# Patient Record
Sex: MALE | Race: WHITE | NOT HISPANIC OR LATINO | Employment: FULL TIME | URBAN - METROPOLITAN AREA
[De-identification: names, ages, dates, MRNs, and addresses within clinical notes are randomized per-mention and may not be internally consistent; named-entity substitution may affect disease eponyms.]

---

## 2023-04-23 DIAGNOSIS — E78.5 HYPERLIPIDEMIA, UNSPECIFIED: ICD-10-CM

## 2023-04-23 DIAGNOSIS — I10 ESSENTIAL (PRIMARY) HYPERTENSION: ICD-10-CM

## 2023-04-24 RX ORDER — ATORVASTATIN CALCIUM 40 MG/1
TABLET, FILM COATED ORAL
Qty: 30 TABLET | Refills: 5 | OUTPATIENT
Start: 2023-04-24

## 2023-04-24 RX ORDER — OLMESARTAN MEDOXOMIL 40 MG/1
TABLET ORAL
Qty: 30 TABLET | Refills: 5 | OUTPATIENT
Start: 2023-04-24

## 2023-04-25 RX ORDER — OLMESARTAN MEDOXOMIL 40 MG/1
40 TABLET ORAL DAILY
Qty: 30 TABLET | Refills: 0 | Status: SHIPPED | OUTPATIENT
Start: 2023-04-25 | End: 2023-05-08 | Stop reason: SDUPTHER

## 2023-04-25 RX ORDER — ATORVASTATIN CALCIUM 40 MG/1
40 TABLET, FILM COATED ORAL DAILY
Qty: 30 TABLET | Refills: 0 | Status: SHIPPED | OUTPATIENT
Start: 2023-04-25 | End: 2023-05-23

## 2023-04-25 RX ORDER — ATORVASTATIN CALCIUM 40 MG/1
40 TABLET, FILM COATED ORAL DAILY
COMMUNITY
Start: 2020-10-25 | End: 2023-04-25 | Stop reason: SDUPTHER

## 2023-04-25 RX ORDER — OLMESARTAN MEDOXOMIL 40 MG/1
40 TABLET ORAL DAILY
COMMUNITY
Start: 2020-10-28 | End: 2023-04-25 | Stop reason: SDUPTHER

## 2023-05-08 ENCOUNTER — OFFICE VISIT (OUTPATIENT)
Dept: PRIMARY CARE | Facility: CLINIC | Age: 58
End: 2023-05-08
Payer: COMMERCIAL

## 2023-05-08 VITALS
WEIGHT: 297.6 LBS | HEART RATE: 83 BPM | SYSTOLIC BLOOD PRESSURE: 130 MMHG | HEIGHT: 67 IN | DIASTOLIC BLOOD PRESSURE: 70 MMHG | BODY MASS INDEX: 46.71 KG/M2

## 2023-05-08 DIAGNOSIS — R53.83 FATIGUE, UNSPECIFIED TYPE: ICD-10-CM

## 2023-05-08 DIAGNOSIS — I71.21 ANEURYSM OF ASCENDING AORTA WITHOUT RUPTURE (CMS-HCC): ICD-10-CM

## 2023-05-08 DIAGNOSIS — Z00.00 HEALTH MAINTENANCE EXAMINATION: Primary | ICD-10-CM

## 2023-05-08 DIAGNOSIS — G47.33 OBSTRUCTIVE SLEEP APNEA: ICD-10-CM

## 2023-05-08 DIAGNOSIS — I10 ESSENTIAL (PRIMARY) HYPERTENSION: ICD-10-CM

## 2023-05-08 DIAGNOSIS — R07.89 CHEST DISCOMFORT: ICD-10-CM

## 2023-05-08 DIAGNOSIS — E78.5 HYPERLIPIDEMIA, UNSPECIFIED HYPERLIPIDEMIA TYPE: ICD-10-CM

## 2023-05-08 DIAGNOSIS — Z23 NEED FOR SHINGLES VACCINE: ICD-10-CM

## 2023-05-08 DIAGNOSIS — I10 HYPERTENSION, UNSPECIFIED TYPE: ICD-10-CM

## 2023-05-08 DIAGNOSIS — R00.0 TACHYCARDIA: ICD-10-CM

## 2023-05-08 LAB
POC APPEARANCE, URINE: CLEAR
POC BILIRUBIN, URINE: NEGATIVE
POC BLOOD, URINE: NEGATIVE
POC COLOR, URINE: YELLOW
POC GLUCOSE, URINE: NEGATIVE MG/DL
POC KETONES, URINE: NEGATIVE MG/DL
POC LEUKOCYTES, URINE: NEGATIVE
POC NITRITE,URINE: NEGATIVE
POC PH, URINE: 6 PH
POC PROTEIN, URINE: NEGATIVE MG/DL
POC SPECIFIC GRAVITY, URINE: 1.01
POC UROBILINOGEN, URINE: 0.2 EU/DL

## 2023-05-08 PROCEDURE — 81002 URINALYSIS NONAUTO W/O SCOPE: CPT | Performed by: STUDENT IN AN ORGANIZED HEALTH CARE EDUCATION/TRAINING PROGRAM

## 2023-05-08 PROCEDURE — 1036F TOBACCO NON-USER: CPT | Performed by: STUDENT IN AN ORGANIZED HEALTH CARE EDUCATION/TRAINING PROGRAM

## 2023-05-08 PROCEDURE — 3078F DIAST BP <80 MM HG: CPT | Performed by: STUDENT IN AN ORGANIZED HEALTH CARE EDUCATION/TRAINING PROGRAM

## 2023-05-08 PROCEDURE — 99396 PREV VISIT EST AGE 40-64: CPT | Performed by: STUDENT IN AN ORGANIZED HEALTH CARE EDUCATION/TRAINING PROGRAM

## 2023-05-08 PROCEDURE — 90750 HZV VACC RECOMBINANT IM: CPT | Performed by: STUDENT IN AN ORGANIZED HEALTH CARE EDUCATION/TRAINING PROGRAM

## 2023-05-08 PROCEDURE — 99213 OFFICE O/P EST LOW 20 MIN: CPT | Performed by: STUDENT IN AN ORGANIZED HEALTH CARE EDUCATION/TRAINING PROGRAM

## 2023-05-08 PROCEDURE — 90471 IMMUNIZATION ADMIN: CPT | Performed by: STUDENT IN AN ORGANIZED HEALTH CARE EDUCATION/TRAINING PROGRAM

## 2023-05-08 PROCEDURE — 93000 ELECTROCARDIOGRAM COMPLETE: CPT | Performed by: STUDENT IN AN ORGANIZED HEALTH CARE EDUCATION/TRAINING PROGRAM

## 2023-05-08 PROCEDURE — 3075F SYST BP GE 130 - 139MM HG: CPT | Performed by: STUDENT IN AN ORGANIZED HEALTH CARE EDUCATION/TRAINING PROGRAM

## 2023-05-08 RX ORDER — OLMESARTAN MEDOXOMIL 40 MG/1
40 TABLET ORAL DAILY
Qty: 90 TABLET | Refills: 1 | Status: SHIPPED | OUTPATIENT
Start: 2023-05-08 | End: 2023-11-27

## 2023-05-08 RX ORDER — METOPROLOL SUCCINATE 50 MG/1
50 TABLET, EXTENDED RELEASE ORAL DAILY
COMMUNITY
End: 2023-05-23

## 2023-05-08 NOTE — PROGRESS NOTES
Subjective   Patient ID: Kendall Watson is a 57 y.o. male who presents for Hypertension and Hyperlipidemia.  Today he is accompanied by alone.     HPI    1. Health Maintenance  Immunizations: Needs second dose of Shingrix.  Colon Cancer Screening: Last performed in 2016, 10 year clearance.  Prostate Cancer Screening: March 2022 was 0.46, prior 0.20.  Diet: Poor  Exercise: Started riding a bike and pickle ball.  Tobacco: Denies    2. HTN/Tachycardia  Currently taking Olmesartan 40 mg daily and Metoprolol ER 50 mg daily.   Denies shortness of breath, headaches, leg pain or edema.  Does not check BP at home.    3. Fatigue  Feels he needs to take a nap daily.  Goes to bed at 9 PM and wakes up at 4 PM.  Occasionally will need to wake up at night to urinate.  He drinks coffee in the morning, and usually no later than 9 AM in the morning.  History of MIGUELINA, currently using mask and uses it nightly.  Recently had this adjusted and feels better but continues to feel fatigued.  He is concerned that this may be related to low testosterone and desires to have this checked.    4. HLD  Currently taking atorvastatin 40 mg daily.  Last lipid panel Sept. 2022 was 163, 49, 72, 209.  Denies myalgias.  Desires to have this rechecked.    5. Chest discomfort  Over the last 6 months he has felt a dull shock in his left chest.  After the dull shock, he will feel lightheaded and dizzy.  This usually lasts for a few seconds then goes away.  Approximately 1 month ago, he felt this happening 3 times a day for 1.5 weeks. He thought this was related to his Tumeric which he stopped.  Since then, his symptoms have not returned.    6. Ascending Aortic Aneurysm   Recently saw his cardiac surgeon in April 2023 and A CT Angio performed.  Currently states his aneurysm is stable, and to follow up in a year for repeat imaging.    Current Outpatient Medications on File Prior to Visit   Medication Sig Dispense Refill    atorvastatin (Lipitor) 40 mg tablet  "Take 1 tablet (40 mg) by mouth once daily. 30 tablet 0    metoprolol succinate XL (Toprol-XL) 50 mg 24 hr tablet Take 1 tablet (50 mg) by mouth once daily.      olmesartan (BENIcar) 40 mg tablet Take 1 tablet (40 mg) by mouth once daily. 30 tablet 0     No current facility-administered medications on file prior to visit.        No Known Allergies    Immunization History   Administered Date(s) Administered    Moderna SARS-CoV-2 Vaccination 02/28/2021, 04/01/2021         Review of Systems  All pertinent positive symptoms are included in the history of present illness.  All other systems have been reviewed and are negative and noncontributory to this patient's current ailments.     Objective   /70 (BP Location: Left arm, Patient Position: Sitting, BP Cuff Size: Adult)   Pulse 83   Ht 1.702 m (5' 7\")   Wt 135 kg (297 lb 9.6 oz)   BMI 46.61 kg/m²   BSA: 2.53 meters squared  No visits with results within 1 Month(s) from this visit.   Latest known visit with results is:   [facility] Encounter on 04/03/2023   Component Date Value Ref Range Status    POC Creatinine 04/03/2023 0.8  0.6 - 1.3 mg/dL Final    POCT GFR - DATA CONVERSION 04/03/2023 >60  >60 mL/min/1.73m2 Final    Comment:  POCT eGFR is intended for Radiology screening purposes only.   If patient is , multiply result by 1.21.         Physical Exam  CONSTITUTIONAL - obese, well developed, looks like stated age, in no acute distress, not ill-appearing, and not tired appearing  SKIN - normal skin color and pigmentation, normal skin turgor without rash, lesions, or nodules visualized  HEAD - no trauma, normocephalic  EYES - pupils are equal and reactive to light, extraocular muscles are intact, and normal external exam  ENT - TM's intact, no injection, no signs of infection, uvula midline, normal tongue movement and throat normal, no exudate, nasal passage without discharge and patent  NECK - supple without rigidity, no neck mass was observed, no " thyromegaly or thyroid nodules  CHEST - clear to auscultation, no wheezing, no crackles and no rales, good effort  CARDIAC - regular rate and regular rhythm, no skipped beats, no murmur  ABDOMEN - no organomegaly, soft, nontender, nondistended, normal bowel sounds, no guarding/rebound/rigidity, negative McBurney sign and negative Martell sign  EXTREMITIES - no edema, no deformities  NEUROLOGICAL - normal gait, normal balance, normal motor, no ataxia, DTRs equal and symmetrical; alert, oriented and no focal signs  PSYCHIATRIC - alert, pleasant and cordial, age-appropriate  IMMUNOLOGIC - no cervical lymphadenopathy     Assessment/Plan   1. Health Maintenance  Complete history and physical examination was performed  EKG reveals normal sinus rhythm, no acute ischemic changes.  We will notify of test results once available and make treatment recommendations accordingly    2. HTN/Tachycardia  Stable, continue olmesartan 40 mg daily and metoprolol 50 ER mg daily.  Blood pressure goal should be below 130/80, ideally 120/80  Please check your blood pressure at home and keep a log.  Please bring this log with you to your next appointment    3. Fatigue  Testosterone order was placed today.  We will notify you of the results and make recommendations accordingly.    4. HLD  Lipid panel was ordered today.  We will notify you of the results and make recommendations accordingly.  In the meantime continue atorvastatin 40 mg daily.    5. Chest discomfort  An EKG was performed today, showed normal sinus rhythm with no acute ischemic changes.  No significant changes from prior EKG seen today.  Due to your symptomology, I do recommend discussing this with a cardiologist to see if further work-up is necessary.    6. Ascending Aortic Aneurysm   Stable, recommend to continue with your cardiac surgeon secondary to your preestablished protocol.    7. Need for shingles vaccine  All questions were answered and you were counseled on  immunization(s) in detail and vaccination was provided    Follow-up in 6 months for medication check.

## 2023-05-13 ENCOUNTER — LAB (OUTPATIENT)
Dept: LAB | Facility: LAB | Age: 58
End: 2023-05-13
Payer: COMMERCIAL

## 2023-05-13 ENCOUNTER — APPOINTMENT (OUTPATIENT)
Dept: LAB | Facility: LAB | Age: 58
End: 2023-05-13
Payer: COMMERCIAL

## 2023-05-13 DIAGNOSIS — Z00.00 HEALTH MAINTENANCE EXAMINATION: ICD-10-CM

## 2023-05-13 DIAGNOSIS — E78.5 HYPERLIPIDEMIA, UNSPECIFIED HYPERLIPIDEMIA TYPE: ICD-10-CM

## 2023-05-13 DIAGNOSIS — R53.83 FATIGUE, UNSPECIFIED TYPE: ICD-10-CM

## 2023-05-13 LAB
ALANINE AMINOTRANSFERASE (SGPT) (U/L) IN SER/PLAS: 29 U/L (ref 10–52)
ALBUMIN (G/DL) IN SER/PLAS: 4.2 G/DL (ref 3.4–5)
ALKALINE PHOSPHATASE (U/L) IN SER/PLAS: 65 U/L (ref 33–120)
ANION GAP IN SER/PLAS: 11 MMOL/L (ref 10–20)
ASPARTATE AMINOTRANSFERASE (SGOT) (U/L) IN SER/PLAS: 26 U/L (ref 9–39)
BASOPHILS (10*3/UL) IN BLOOD BY AUTOMATED COUNT: 0.04 X10E9/L (ref 0–0.1)
BASOPHILS/100 LEUKOCYTES IN BLOOD BY AUTOMATED COUNT: 0.6 % (ref 0–2)
BILIRUBIN TOTAL (MG/DL) IN SER/PLAS: 0.6 MG/DL (ref 0–1.2)
CALCIDIOL (25 OH VITAMIN D3) (NG/ML) IN SER/PLAS: 36 NG/ML
CALCIUM (MG/DL) IN SER/PLAS: 9.4 MG/DL (ref 8.6–10.6)
CARBON DIOXIDE, TOTAL (MMOL/L) IN SER/PLAS: 29 MMOL/L (ref 21–32)
CHLORIDE (MMOL/L) IN SER/PLAS: 103 MMOL/L (ref 98–107)
CHOLESTEROL (MG/DL) IN SER/PLAS: 127 MG/DL (ref 0–199)
CHOLESTEROL IN HDL (MG/DL) IN SER/PLAS: 45.7 MG/DL
CHOLESTEROL/HDL RATIO: 2.8
COBALAMIN (VITAMIN B12) (PG/ML) IN SER/PLAS: 352 PG/ML (ref 211–911)
CREATININE (MG/DL) IN SER/PLAS: 0.8 MG/DL (ref 0.5–1.3)
EOSINOPHILS (10*3/UL) IN BLOOD BY AUTOMATED COUNT: 0.15 X10E9/L (ref 0–0.7)
EOSINOPHILS/100 LEUKOCYTES IN BLOOD BY AUTOMATED COUNT: 2.4 % (ref 0–6)
ERYTHROCYTE DISTRIBUTION WIDTH (RATIO) BY AUTOMATED COUNT: 13.4 % (ref 11.5–14.5)
ERYTHROCYTE MEAN CORPUSCULAR HEMOGLOBIN CONCENTRATION (G/DL) BY AUTOMATED: 30.5 G/DL (ref 32–36)
ERYTHROCYTE MEAN CORPUSCULAR VOLUME (FL) BY AUTOMATED COUNT: 98 FL (ref 80–100)
ERYTHROCYTES (10*6/UL) IN BLOOD BY AUTOMATED COUNT: 4.25 X10E12/L (ref 4.5–5.9)
ESTIMATED AVERAGE GLUCOSE FOR HBA1C: 123 MG/DL
GFR MALE: >90 ML/MIN/1.73M2
GLUCOSE (MG/DL) IN SER/PLAS: 110 MG/DL (ref 74–99)
HEMATOCRIT (%) IN BLOOD BY AUTOMATED COUNT: 41.6 % (ref 41–52)
HEMOGLOBIN (G/DL) IN BLOOD: 12.7 G/DL (ref 13.5–17.5)
HEMOGLOBIN A1C/HEMOGLOBIN TOTAL IN BLOOD: 5.9 %
IMMATURE GRANULOCYTES/100 LEUKOCYTES IN BLOOD BY AUTOMATED COUNT: 0.5 % (ref 0–0.9)
LDL: 64 MG/DL (ref 0–99)
LEUKOCYTES (10*3/UL) IN BLOOD BY AUTOMATED COUNT: 6.4 X10E9/L (ref 4.4–11.3)
LYMPHOCYTES (10*3/UL) IN BLOOD BY AUTOMATED COUNT: 1.72 X10E9/L (ref 1.2–4.8)
LYMPHOCYTES/100 LEUKOCYTES IN BLOOD BY AUTOMATED COUNT: 27 % (ref 13–44)
MONOCYTES (10*3/UL) IN BLOOD BY AUTOMATED COUNT: 0.6 X10E9/L (ref 0.1–1)
MONOCYTES/100 LEUKOCYTES IN BLOOD BY AUTOMATED COUNT: 9.4 % (ref 2–10)
NEUTROPHILS (10*3/UL) IN BLOOD BY AUTOMATED COUNT: 3.82 X10E9/L (ref 1.2–7.7)
NEUTROPHILS/100 LEUKOCYTES IN BLOOD BY AUTOMATED COUNT: 60.1 % (ref 40–80)
NRBC (PER 100 WBCS) BY AUTOMATED COUNT: 0 /100 WBC (ref 0–0)
PLATELETS (10*3/UL) IN BLOOD AUTOMATED COUNT: 217 X10E9/L (ref 150–450)
POTASSIUM (MMOL/L) IN SER/PLAS: 4.8 MMOL/L (ref 3.5–5.3)
PROSTATE SPECIFIC AG (NG/ML) IN SER/PLAS: 0.32 NG/ML (ref 0–4)
PROTEIN TOTAL: 6.7 G/DL (ref 6.4–8.2)
SODIUM (MMOL/L) IN SER/PLAS: 138 MMOL/L (ref 136–145)
THYROTROPIN (MIU/L) IN SER/PLAS BY DETECTION LIMIT <= 0.05 MIU/L: 1.86 MIU/L (ref 0.44–3.98)
TRIGLYCERIDE (MG/DL) IN SER/PLAS: 86 MG/DL (ref 0–149)
UREA NITROGEN (MG/DL) IN SER/PLAS: 17 MG/DL (ref 6–23)
VLDL: 17 MG/DL (ref 0–40)

## 2023-05-13 PROCEDURE — 36415 COLL VENOUS BLD VENIPUNCTURE: CPT

## 2023-05-13 PROCEDURE — 84443 ASSAY THYROID STIM HORMONE: CPT

## 2023-05-13 PROCEDURE — 84403 ASSAY OF TOTAL TESTOSTERONE: CPT

## 2023-05-13 PROCEDURE — 80053 COMPREHEN METABOLIC PANEL: CPT

## 2023-05-13 PROCEDURE — 80061 LIPID PANEL: CPT

## 2023-05-13 PROCEDURE — 85025 COMPLETE CBC W/AUTO DIFF WBC: CPT

## 2023-05-13 PROCEDURE — 82607 VITAMIN B-12: CPT

## 2023-05-13 PROCEDURE — 84402 ASSAY OF FREE TESTOSTERONE: CPT

## 2023-05-13 PROCEDURE — 83036 HEMOGLOBIN GLYCOSYLATED A1C: CPT

## 2023-05-13 PROCEDURE — 84153 ASSAY OF PSA TOTAL: CPT

## 2023-05-13 PROCEDURE — 82306 VITAMIN D 25 HYDROXY: CPT

## 2023-05-14 NOTE — RESULT ENCOUNTER NOTE
Hemoglobin A1c shows stability at 5.9%    Complete blood cell count shows a slight anemia but overall stable with hematocrit and hemoglobin very similar from 1 year ago    Sugar slightly elevated at 110 but otherwise liver, kidneys, electrolytes are all within normal limits    Total cholesterol looks good and actually one of the best on file at 127, HDL 45, LDL 64, triglycerides 86    Vitamin B12 within normal limits alongside thyroid    Vitamin D within normal limits at 36    Prostate within normal limits    At this time we are still waiting for the free and total testosterone

## 2023-05-15 ENCOUNTER — TELEPHONE (OUTPATIENT)
Dept: PRIMARY CARE | Facility: CLINIC | Age: 58
End: 2023-05-15
Payer: COMMERCIAL

## 2023-05-15 NOTE — TELEPHONE ENCOUNTER
----- Message from Al Becker, DO sent at 5/14/2023 12:49 PM EDT -----      Hemoglobin A1c shows stability at 5.9%    Complete blood cell count shows a slight anemia but overall stable with hematocrit and hemoglobin very similar from 1 year ago    Sugar slightly elevated at 110 but otherwise liver, kidneys, electrolytes are all within normal limits    Total cholesterol looks good and actually one of the best on file at 127, HDL 45, LDL 64, triglycerides 86    Vitamin B12 within normal limits alongside thyroid    Vitamin D within normal limits at 36    Prostate within normal limits    At this time we are still waiting for the free and total testosterone

## 2023-05-20 LAB
TESTOSTERONE FREE (CHAN): 53.2 PG/ML (ref 35–155)
TESTOSTERONE,TOTAL,LC-MS/MS: 220 NG/DL (ref 250–1100)

## 2023-05-21 NOTE — RESULT ENCOUNTER NOTE
Free testosterone well within normal limits at 53 but total testosterone slightly low at 220 with normal being above 250    This is most likely due to sleep apnea    Patient now has a choice, if you would like, we can easily get him to urology to discuss this further

## 2023-05-22 ENCOUNTER — TELEPHONE (OUTPATIENT)
Dept: PRIMARY CARE | Facility: CLINIC | Age: 58
End: 2023-05-22
Payer: COMMERCIAL

## 2023-05-22 NOTE — TELEPHONE ENCOUNTER
----- Message from Al Becker DO sent at 5/21/2023  8:43 AM EDT -----  Free testosterone well within normal limits at 53 but total testosterone slightly low at 220 with normal being above 250    This is most likely due to sleep apnea    Patient now has a choice, if you would like, we can easily get him to urology to discuss this further

## 2023-05-23 DIAGNOSIS — I10 ESSENTIAL (PRIMARY) HYPERTENSION: ICD-10-CM

## 2023-05-23 DIAGNOSIS — E78.5 HYPERLIPIDEMIA, UNSPECIFIED: ICD-10-CM

## 2023-05-23 RX ORDER — METOPROLOL SUCCINATE 50 MG/1
TABLET, EXTENDED RELEASE ORAL
Qty: 90 TABLET | Refills: 1 | Status: SHIPPED | OUTPATIENT
Start: 2023-05-23 | End: 2023-11-27 | Stop reason: SDUPTHER

## 2023-05-23 RX ORDER — ATORVASTATIN CALCIUM 40 MG/1
TABLET, FILM COATED ORAL
Qty: 90 TABLET | Refills: 1 | Status: SHIPPED | OUTPATIENT
Start: 2023-05-23 | End: 2024-01-02 | Stop reason: WASHOUT

## 2023-10-26 ENCOUNTER — TELEMEDICINE (OUTPATIENT)
Dept: PRIMARY CARE | Facility: CLINIC | Age: 58
End: 2023-10-26
Payer: COMMERCIAL

## 2023-10-26 DIAGNOSIS — J01.40 ACUTE NON-RECURRENT PANSINUSITIS: Primary | ICD-10-CM

## 2023-10-26 PROCEDURE — 99213 OFFICE O/P EST LOW 20 MIN: CPT | Performed by: STUDENT IN AN ORGANIZED HEALTH CARE EDUCATION/TRAINING PROGRAM

## 2023-10-26 RX ORDER — AMOXICILLIN AND CLAVULANATE POTASSIUM 875; 125 MG/1; MG/1
875 TABLET, FILM COATED ORAL 2 TIMES DAILY
Qty: 14 TABLET | Refills: 0 | Status: SHIPPED | OUTPATIENT
Start: 2023-10-26 | End: 2023-11-02

## 2023-10-26 RX ORDER — FLUTICASONE PROPIONATE 50 MCG
2 SPRAY, SUSPENSION (ML) NASAL 2 TIMES DAILY PRN
Qty: 16 G | Refills: 0 | Status: SHIPPED | OUTPATIENT
Start: 2023-10-26 | End: 2024-04-19 | Stop reason: WASHOUT

## 2023-10-26 NOTE — PROGRESS NOTES
Subjective   Patient ID: Kendall Watson is a 58 y.o. male who presents for Sinusitis.    HPI  Presents virtually with chief complaint of runny and stuffy nose with excessive mucus production for the past 3 days  Constant yellow rhinorrhea  Feels slight pressure in his sinuses  Productive cough worse in the morning with sensation of postnasal drip  Denies fever, sore throat, body aches, loss of taste, SOB, wheezing  At home COVID test was -3 days ago    Review of Systems  All pertinent positive symptoms are included in the history of present illness.    All other systems have been reviewed and are negative and noncontributory to this patient's current ailments.    Past Medical History:   Diagnosis Date    Diverticulitis of intestine, part unspecified, without perforation or abscess without bleeding 08/15/2016    Acute diverticulitis of intestine    Encounter for immunization 10/28/2020    Need for Tdap vaccination    Encounter for removal of sutures 07/01/2021    Visit for suture removal    Encounter for screening for malignant neoplasm of colon 08/22/2016    Encounter for screening colonoscopy    Essential (primary) hypertension 11/14/2022    Benign essential hypertension    Hyperlipidemia, unspecified 08/15/2022    Hyperlipidemia    Laceration without foreign body of other part of head, initial encounter 07/01/2021    Facial laceration    Left lower quadrant pain 08/15/2016    LLQ pain    Other chest pain 11/24/2020    Chest discomfort    Pain in left shoulder 10/28/2020    Left shoulder pain    Sleep apnea, unspecified 03/15/2016    Sleep apnea     Past Surgical History:   Procedure Laterality Date    KNEE SURGERY  06/12/2013    Knee Surgery    SHOULDER SURGERY  06/12/2013    Shoulder Surgery     Social History     Tobacco Use    Smoking status: Former     Types: Cigarettes    Smokeless tobacco: Never     No family history on file.  No Known Allergies  Immunization History   Administered Date(s) Administered     Flu vaccine (IIV4), preservative free *Check age/dose* 10/26/2022    Flu vaccine, quadrivalent, recombinant, preservative free, adult (FLUBLOK) 11/19/2019    Hepatitis A vaccine, age 19 years and greater (HAVRIX) 08/13/2019    Moderna SARS-CoV-2 Vaccination 02/28/2021, 04/01/2021    Tdap vaccine, age 7 year and older (BOOSTRIX) 08/13/2019, 10/28/2020    Zoster vaccine, recombinant, adult (SHINGRIX) 10/26/2022, 05/08/2023     Current Outpatient Medications   Medication Instructions    atorvastatin (Lipitor) 40 mg tablet TAKE 1 TABLET BY MOUTH EVERY DAY    metoprolol succinate XL (Toprol-XL) 50 mg 24 hr tablet TAKE 1 TABLET BY MOUTH EVERY DAY    olmesartan (BENICAR) 40 mg, oral, Daily     Objective   Visit Vitals  Smoking Status Former     No visits with results within 1 Month(s) from this visit.   Latest known visit with results is:   Lab on 05/13/2023   Component Date Value    PSA 05/13/2023 0.32     TSH 05/13/2023 1.86     Cholesterol 05/13/2023 127     HDL 05/13/2023 45.7     Cholesterol/HDL Ratio 05/13/2023 2.8     LDL 05/13/2023 64     VLDL 05/13/2023 17     Triglycerides 05/13/2023 86     Glucose 05/13/2023 110 (H)     Sodium 05/13/2023 138     Potassium 05/13/2023 4.8     Chloride 05/13/2023 103     Bicarbonate 05/13/2023 29     Anion Gap 05/13/2023 11     Urea Nitrogen 05/13/2023 17     Creatinine 05/13/2023 0.80     GFR MALE 05/13/2023 >90     Calcium 05/13/2023 9.4     Albumin 05/13/2023 4.2     Alkaline Phosphatase 05/13/2023 65     Total Protein 05/13/2023 6.7     AST 05/13/2023 26     Total Bilirubin 05/13/2023 0.6     ALT (SGPT) 05/13/2023 29     WBC 05/13/2023 6.4     nRBC 05/13/2023 0.0     RBC 05/13/2023 4.25 (L)     Hemoglobin 05/13/2023 12.7 (L)     Hematocrit 05/13/2023 41.6     MCV 05/13/2023 98     MCHC 05/13/2023 30.5 (L)     Platelets 05/13/2023 217     RDW 05/13/2023 13.4     Neutrophils % 05/13/2023 60.1     Immature Granulocytes %,* 05/13/2023 0.5     Lymphocytes % 05/13/2023 27.0      Monocytes % 05/13/2023 9.4     Eosinophils % 05/13/2023 2.4     Basophils % 05/13/2023 0.6     Neutrophils Absolute 05/13/2023 3.82     Lymphocytes Absolute 05/13/2023 1.72     Monocytes Absolute 05/13/2023 0.60     Eosinophils Absolute 05/13/2023 0.15     Basophils Absolute 05/13/2023 0.04     Hemoglobin A1C 05/13/2023 5.9 (A)     Estimated Average Glucose 05/13/2023 123     Testosterone, Free 05/13/2023 53.2     Testosterone, Total, LC-* 05/13/2023 220 (L)     Vitamin D, 25-Hydroxy 05/13/2023 36     Vitamin B-12 05/13/2023 352      Physical Exam  CONSTITUTIONAL - well nourished, well developed, looks like stated age, in no acute distress, not ill-appearing, and not tired appearing  SKIN - normal skin color and pigmentation, normal skin turgor without rash, lesions, or nodules visualized  HEAD - no trauma, normocephalic  EYES - normal external exam  CHEST -no distressed breathing, good effort  EXTREMITIES - no edema, no deformities  NEUROLOGICAL - normal balance, normal motor, no ataxia  PSYCHIATRIC - alert, pleasant and cordial, age-appropriate     Assessment/Plan   Problem List Items Addressed This Visit          ENT    Acute non-recurrent pansinusitis - Primary     Risks, benefits, and options of treatment(s) were discussed after reviewing all current medication(s) and drug allergy(ies)  I opted for the treatment that we discussed with instructions on the medication use for your underlying medical ailment(s)  I encouraged supportive care such as rest, fluids and Advil/Tylenol as warranted  Return to the clinic in 7-10 days or sooner if symptoms worsen or persist as we will then further evaluate

## 2023-11-21 DIAGNOSIS — I10 HYPERTENSION, UNSPECIFIED TYPE: ICD-10-CM

## 2023-11-21 DIAGNOSIS — I10 ESSENTIAL (PRIMARY) HYPERTENSION: ICD-10-CM

## 2023-11-27 RX ORDER — METOPROLOL SUCCINATE 50 MG/1
50 TABLET, EXTENDED RELEASE ORAL DAILY
Qty: 30 TABLET | Refills: 0 | Status: SHIPPED | OUTPATIENT
Start: 2023-11-27 | End: 2024-01-02 | Stop reason: SDUPTHER

## 2023-11-27 RX ORDER — OLMESARTAN MEDOXOMIL 40 MG/1
40 TABLET ORAL DAILY
Qty: 30 TABLET | Refills: 0 | Status: SHIPPED | OUTPATIENT
Start: 2023-11-27 | End: 2024-01-02 | Stop reason: SDUPTHER

## 2023-12-05 ENCOUNTER — TELEPHONE (OUTPATIENT)
Dept: CARDIOLOGY | Facility: CLINIC | Age: 58
End: 2023-12-05
Payer: COMMERCIAL

## 2023-12-05 NOTE — TELEPHONE ENCOUNTER
Spoke with patient and instructions provided- cont to monitor, if symptoms worsen, increase in frequency/intensity, sob, fever would recommend ER eval. Patient verb understanding.

## 2023-12-05 NOTE — TELEPHONE ENCOUNTER
"Spoke with patient stated he is experiencing very brief episodes of CP followed by LH, comes and goes. \"Difficult to describe\". Feels that exercise helps pain and he remains active. Denies feelings of palpitations. Unsure if related to COVID infection he had in October. Bp 104/65 HR 75  "

## 2023-12-05 NOTE — TELEPHONE ENCOUNTER
Chavez left a vm stating that he has been having lightheadedness and chest pain that comes and goes. This is something that Dr. Mcqueen is aware of and told the pt to call back if it happens again. He stated he does not feel like it is emergent but it has been happening and wants to let Dr. Mcqueen know.

## 2023-12-11 ENCOUNTER — APPOINTMENT (OUTPATIENT)
Dept: PRIMARY CARE | Facility: CLINIC | Age: 58
End: 2023-12-11
Payer: COMMERCIAL

## 2023-12-24 DIAGNOSIS — I10 HYPERTENSION, UNSPECIFIED TYPE: ICD-10-CM

## 2023-12-27 ENCOUNTER — APPOINTMENT (OUTPATIENT)
Dept: PRIMARY CARE | Facility: CLINIC | Age: 58
End: 2023-12-27
Payer: COMMERCIAL

## 2024-01-02 ENCOUNTER — OFFICE VISIT (OUTPATIENT)
Dept: PRIMARY CARE | Facility: CLINIC | Age: 59
End: 2024-01-02
Payer: COMMERCIAL

## 2024-01-02 VITALS
WEIGHT: 305.8 LBS | BODY MASS INDEX: 48 KG/M2 | DIASTOLIC BLOOD PRESSURE: 78 MMHG | SYSTOLIC BLOOD PRESSURE: 130 MMHG | HEART RATE: 94 BPM | HEIGHT: 67 IN

## 2024-01-02 DIAGNOSIS — R07.89 CHEST DISCOMFORT: ICD-10-CM

## 2024-01-02 DIAGNOSIS — I10 ESSENTIAL (PRIMARY) HYPERTENSION: ICD-10-CM

## 2024-01-02 DIAGNOSIS — I71.21 ANEURYSM OF ASCENDING AORTA WITHOUT RUPTURE (CMS-HCC): ICD-10-CM

## 2024-01-02 DIAGNOSIS — E78.5 HYPERLIPIDEMIA, UNSPECIFIED HYPERLIPIDEMIA TYPE: ICD-10-CM

## 2024-01-02 DIAGNOSIS — R00.0 TACHYCARDIA: Primary | ICD-10-CM

## 2024-01-02 DIAGNOSIS — I10 HYPERTENSION, UNSPECIFIED TYPE: ICD-10-CM

## 2024-01-02 DIAGNOSIS — M77.10 LATERAL EPICONDYLITIS, UNSPECIFIED LATERALITY: ICD-10-CM

## 2024-01-02 DIAGNOSIS — E78.5 HYPERLIPIDEMIA, UNSPECIFIED: ICD-10-CM

## 2024-01-02 PROCEDURE — 3075F SYST BP GE 130 - 139MM HG: CPT | Performed by: STUDENT IN AN ORGANIZED HEALTH CARE EDUCATION/TRAINING PROGRAM

## 2024-01-02 PROCEDURE — 3078F DIAST BP <80 MM HG: CPT | Performed by: STUDENT IN AN ORGANIZED HEALTH CARE EDUCATION/TRAINING PROGRAM

## 2024-01-02 PROCEDURE — 1036F TOBACCO NON-USER: CPT | Performed by: STUDENT IN AN ORGANIZED HEALTH CARE EDUCATION/TRAINING PROGRAM

## 2024-01-02 PROCEDURE — 99214 OFFICE O/P EST MOD 30 MIN: CPT | Performed by: STUDENT IN AN ORGANIZED HEALTH CARE EDUCATION/TRAINING PROGRAM

## 2024-01-02 RX ORDER — OLMESARTAN MEDOXOMIL 40 MG/1
40 TABLET ORAL DAILY
Qty: 30 TABLET | Refills: 0 | OUTPATIENT
Start: 2024-01-02

## 2024-01-02 RX ORDER — OLMESARTAN MEDOXOMIL 40 MG/1
40 TABLET ORAL DAILY
Qty: 90 TABLET | Refills: 1 | Status: SHIPPED | OUTPATIENT
Start: 2024-01-02 | End: 2024-06-03 | Stop reason: SDUPTHER

## 2024-01-02 RX ORDER — METOPROLOL SUCCINATE 50 MG/1
50 TABLET, EXTENDED RELEASE ORAL DAILY
Qty: 90 TABLET | Refills: 1 | Status: SHIPPED | OUTPATIENT
Start: 2024-01-02 | End: 2024-04-19 | Stop reason: DRUGHIGH

## 2024-01-02 RX ORDER — ATORVASTATIN CALCIUM 40 MG/1
40 TABLET, FILM COATED ORAL DAILY
Qty: 90 TABLET | Refills: 1 | Status: SHIPPED | OUTPATIENT
Start: 2024-01-02 | End: 2024-06-03 | Stop reason: SDUPTHER

## 2024-01-02 RX ORDER — OMEPRAZOLE 20 MG/1
20 CAPSULE, DELAYED RELEASE ORAL 2 TIMES DAILY
Qty: 60 CAPSULE | Refills: 1 | Status: SHIPPED | OUTPATIENT
Start: 2024-01-02 | End: 2024-06-30

## 2024-01-02 ASSESSMENT — PATIENT HEALTH QUESTIONNAIRE - PHQ9
2. FEELING DOWN, DEPRESSED OR HOPELESS: NOT AT ALL
SUM OF ALL RESPONSES TO PHQ9 QUESTIONS 1 AND 2: 0
1. LITTLE INTEREST OR PLEASURE IN DOING THINGS: NOT AT ALL

## 2024-01-02 NOTE — PROGRESS NOTES
Subjective   Patient ID: Kendall Watson is a 58 y.o. male who presents for Hypertension.  Today he is accompanied by alone.     HPI  57 yo male patient presenting today for refill of medications.    1. HTN/Tachycardia  Currently taking Olmesartan 40 mg daily and Metoprolol ER 50 mg daily.   Denies shortness of breath, headaches, leg pain or edema.  Does not check BP at home.  Blood pressure slightly high at today's visit 140/78 but upon repeat was within normal limits    2. HLD  Discontinued atorvastatin 40 mg 1 month ago per cardiology recommendations due to some myalgias  Last lipid panel Sept. 2022 was 163, 49, 72, 209.  Desires to have this rechecked in the near future and even asking to be put placed back on this medication if it is needed    3.  Ascending Aortic Aneurysm   Recently saw his cardiac surgeon in April 2023 and A CT Angio performed.  Currently states his aneurysm is stable, and to follow up in a year for repeat imaging.    4. Chest discomfort  Dull chest pain that radiates to sides of abdomen  Provoked coffee or big meals  Relieved with exercise  Stress test completed with Cardiology this past Fall, normal  Patient was on a fiber supplement and noticed an improvement in chest discomfort after discontinuing it for 5 days    5. Tennis Elbow   Right elbow pain that has been worsening off-and-on due to playing pickle ball  Wish to discuss this further as this continues to be an issue for the past 3+ weeks  Asking for guidance/recommendations    Current Outpatient Medications on File Prior to Visit   Medication Sig Dispense Refill    fluticasone (Flonase) 50 mcg/actuation nasal spray Administer 2 sprays into each nostril 2 times a day as needed for rhinitis. Shake gently. Before first use, prime pump. After use, clean tip and replace cap. 16 g 0    [DISCONTINUED] atorvastatin (Lipitor) 40 mg tablet TAKE 1 TABLET BY MOUTH EVERY DAY 90 tablet 1    [DISCONTINUED] metoprolol succinate XL (Toprol-XL) 50 mg  "24 hr tablet Take 1 tablet (50 mg) by mouth once daily. Do not crush or chew. 30 tablet 0    [DISCONTINUED] olmesartan (BENIcar) 40 mg tablet TAKE 1 TABLET BY MOUTH EVERY DAY 30 tablet 0     No current facility-administered medications on file prior to visit.        No Known Allergies    Immunization History   Administered Date(s) Administered    Flu vaccine (IIV4), preservative free *Check age/dose* 10/26/2022    Flu vaccine, quadrivalent, recombinant, preservative free, adult (FLUBLOK) 11/19/2019    Hepatitis A vaccine, age 19 years and greater (HAVRIX) 08/13/2019    Moderna SARS-CoV-2 Vaccination 02/28/2021, 04/01/2021    Tdap vaccine, age 7 year and older (BOOSTRIX) 08/13/2019, 10/28/2020    Zoster vaccine, recombinant, adult (SHINGRIX) 10/26/2022, 05/08/2023         Review of Systems  All pertinent positive symptoms are included in the history of present illness.  All other systems have been reviewed and are negative and noncontributory to this patient's current ailments.     Objective   /78 (BP Location: Left arm, Patient Position: Sitting, BP Cuff Size: Large adult)   Pulse 94   Ht 1.702 m (5' 7\")   Wt 139 kg (305 lb 12.8 oz)   BMI 47.90 kg/m²   BSA: 2.56 meters squared  No visits with results within 1 Month(s) from this visit.   Latest known visit with results is:   Lab on 05/13/2023   Component Date Value Ref Range Status    PSA 05/13/2023 0.32  0.00 - 4.00 ng/mL Final    The FDA requires that the method used for PSA assay be   reported to the physician. Values obtained with different   assay methods must not be used interchangeably. This test   was performed at [facility] using the [employer] PSA method, which is a sandwich immunoassay using   chemiluminescence for quantitation. The assay is approved  for measurement of prostate-specific antigen (PSA) in   serum and may be used in conjunction with a digital rectal  examination in men 50 years and older as an aid in "   detection of prostate cancer.   5-Alpha-reductase inhibitors (e.g. Proscar, Finasteride,   Avodart, Dutasteride and Luciana) for the treatment of BPH   have been shown to lower PSA levels by an average of 50%   after 6 months of treatment.    TSH 05/13/2023 1.86  0.44 - 3.98 mIU/L Final     TSH testing is performed using different testing    methodology at [facility] than at other    [facility]. Direct result comparisons should    only be made within the same method.    Cholesterol 05/13/2023 127  0 - 199 mg/dL Final    .      AGE      DESIRABLE   BORDERLINE HIGH   HIGH     0-19 Y     0 - 169       170 - 199     >/= 200    20-24 Y     0 - 189       190 - 224     >/= 225         >24 Y     0 - 199       200 - 239     >/= 240   **All ranges are based on fasting samples. Specific   therapeutic targets will vary based on patient-specific   cardiac risk.  .   Pediatric guidelines reference:Pediatrics 2011, 128(S5).   Adult guidelines reference: NCEP ATPIII Guidelines,     AURA 2001, 258:2486-97  .   Venipuncture immediately after or during the    administration of Metamizole may lead to falsely   low results. Testing should be performed immediately   prior to Metamizole dosing.    HDL 05/13/2023 45.7  mg/dL Final    .      AGE      VERY LOW   LOW     NORMAL    HIGH       0-19 Y       < 35   < 40     40-45     ----    20-24 Y       ----   < 40       >45     ----      >24 Y       ----   < 40     40-60      >60  .    Cholesterol/HDL Ratio 05/13/2023 2.8   Final    REF VALUES  DESIRABLE  < 3.4  HIGH RISK  > 5.0    LDL 05/13/2023 64  0 - 99 mg/dL Final    .                           NEAR      BORD      AGE      DESIRABLE  OPTIMAL    HIGH     HIGH     VERY HIGH     0-19 Y     0 - 109     ---    110-129   >/= 130     ----    20-24 Y     0 - 119     ---    120-159   >/= 160     ----      >24 Y     0 -  99   100-129  130-159   160-189     >/=190  .    VLDL 05/13/2023 17  0 - 40 mg/dL Final    Triglycerides  05/13/2023 86  0 - 149 mg/dL Final    .      AGE      DESIRABLE   BORDERLINE HIGH   HIGH     VERY HIGH   0 D-90 D    19 - 174         ----         ----        ----  91 D- 9 Y     0 -  74        75 -  99     >/= 100      ----    10-19 Y     0 -  89        90 - 129     >/= 130      ----    20-24 Y     0 - 114       115 - 149     >/= 150      ----         >24 Y     0 - 149       150 - 199    200- 499    >/= 500  .   Venipuncture immediately after or during the    administration of Metamizole may lead to falsely   low results. Testing should be performed immediately   prior to Metamizole dosing.    Glucose 05/13/2023 110 (H)  74 - 99 mg/dL Final    Sodium 05/13/2023 138  136 - 145 mmol/L Final    Potassium 05/13/2023 4.8  3.5 - 5.3 mmol/L Final    Chloride 05/13/2023 103  98 - 107 mmol/L Final    Bicarbonate 05/13/2023 29  21 - 32 mmol/L Final    Anion Gap 05/13/2023 11  10 - 20 mmol/L Final    Urea Nitrogen 05/13/2023 17  6 - 23 mg/dL Final    Creatinine 05/13/2023 0.80  0.50 - 1.30 mg/dL Final    GFR MALE 05/13/2023 >90  >90 mL/min/1.73m2 Final     CALCULATIONS OF ESTIMATED GFR ARE PERFORMED   USING THE 2021 CKD-EPI STUDY REFIT EQUATION   WITHOUT THE RACE VARIABLE FOR THE IDMS-TRACEABLE   CREATININE METHODS.    https://jasn.asnjournals.org/content/early/2021/09/22/ASN.3482528624    Calcium 05/13/2023 9.4  8.6 - 10.6 mg/dL Final    Albumin 05/13/2023 4.2  3.4 - 5.0 g/dL Final    Alkaline Phosphatase 05/13/2023 65  33 - 120 U/L Final    Total Protein 05/13/2023 6.7  6.4 - 8.2 g/dL Final    AST 05/13/2023 26  9 - 39 U/L Final    Total Bilirubin 05/13/2023 0.6  0.0 - 1.2 mg/dL Final    ALT (SGPT) 05/13/2023 29  10 - 52 U/L Final     Patients treated with Sulfasalazine may generate    falsely decreased results for ALT.    WBC 05/13/2023 6.4  4.4 - 11.3 x10E9/L Final    nRBC 05/13/2023 0.0  0.0 - 0.0 /100 WBC Final    RBC 05/13/2023 4.25 (L)  4.50 - 5.90 x10E12/L Final    Hemoglobin 05/13/2023 12.7 (L)  13.5 - 17.5 g/dL  Final    Hematocrit 05/13/2023 41.6  41.0 - 52.0 % Final    MCV 05/13/2023 98  80 - 100 fL Final    MCHC 05/13/2023 30.5 (L)  32.0 - 36.0 g/dL Final    Platelets 05/13/2023 217  150 - 450 x10E9/L Final    RDW 05/13/2023 13.4  11.5 - 14.5 % Final    Neutrophils % 05/13/2023 60.1  40.0 - 80.0 % Final    Immature Granulocytes %, Automated 05/13/2023 0.5  0.0 - 0.9 % Final     Immature Granulocyte Count (IG) includes promyelocytes,    myelocytes and metamyelocytes but does not include bands.   Percent differential counts (%) should be interpreted in the   context of the absolute cell counts (cells/L).    Lymphocytes % 05/13/2023 27.0  13.0 - 44.0 % Final    Monocytes % 05/13/2023 9.4  2.0 - 10.0 % Final    Eosinophils % 05/13/2023 2.4  0.0 - 6.0 % Final    Basophils % 05/13/2023 0.6  0.0 - 2.0 % Final    Neutrophils Absolute 05/13/2023 3.82  1.20 - 7.70 x10E9/L Final    Lymphocytes Absolute 05/13/2023 1.72  1.20 - 4.80 x10E9/L Final    Monocytes Absolute 05/13/2023 0.60  0.10 - 1.00 x10E9/L Final    Eosinophils Absolute 05/13/2023 0.15  0.00 - 0.70 x10E9/L Final    Basophils Absolute 05/13/2023 0.04  0.00 - 0.10 x10E9/L Final    Hemoglobin A1C 05/13/2023 5.9 (A)  % Final         Diagnosis of Diabetes-Adults   Non-Diabetic: < or = 5.6%   Increased risk for developing diabetes: 5.7-6.4%   Diagnostic of diabetes: > or = 6.5%  .       Monitoring of Diabetes                Age (y)     Therapeutic Goal (%)   Adults:          >18           <7.0   Pediatrics:    13-18           <7.5                   7-12           <8.0                   0- 6            7.5-8.5   American Diabetes Association. Diabetes Care 33(S1), Jan 2010.    Estimated Average Glucose 05/13/2023 123  MG/DL Final    Testosterone, Free 05/13/2023 53.2  35.0 - 155.0 pg/mL Final    This test was developed and its analytical performance  characteristics have been determined by [place] It has  not been cleared or approved  by the U.S. Food and Drug  Administration. This assay has been validated pursuant  to the CLIA regulations and is used for clinical  purposes.    Testosterone, Total, LC-MS/MS 05/13/2023 220 (L)  250 - 1100 ng/dL Final    For additional information, please refer to  http://education.Stamplay/faq/  [email or web address]  (This link is being provided for informational/  educational purposes only.)     This test was developed and its analytical performance  characteristics have been determined by [employer] [place], [place]. It has  not been cleared or approved by the U.S. Food and Drug  Administration. This assay has been validated pursuant  to the CLIA regulations and is used for clinical  purposes.    Vitamin D, 25-Hydroxy 05/13/2023 36  ng/mL Final    .  DEFICIENCY:         < 20   NG/ML  INSUFFICIENCY:      20-29  NG/ML  SUFFICIENCY:         NG/ML    THIS ASSAY ACCURATELY QUANTIFIES THE SUM OF  VITAMIN D3, 25-HYDROXY AND VIT D2,25-HYDROXY.    Vitamin B-12 05/13/2023 352  211 - 911 pg/mL Final       Physical Exam  CONSTITUTIONAL - well nourished, well developed, looks like stated age, in no acute distress, not ill-appearing, and not tired appearing  SKIN - normal skin color and pigmentation, normal skin turgor without rash, lesions, or nodules visualized  HEAD - no trauma, normocephalic  EYES - normal external exam  CHEST -no distressed breathing, good effort  EXTREMITIES - no edema, no deformities  NEUROLOGICAL - normal balance, normal motor, no ataxia  PSYCHIATRIC - alert, pleasant and cordial, age-appropriate    Assessment/Plan   1. HTN/Tachycardia  Stable, continue olmesartan 40 mg daily and metoprolol 50 ER mg daily.  Blood pressure goal should be below 130/80, ideally 120/80  Please check your blood pressure at home and keep a log.    2.  Hyperlipidemia  A cholesterol panel was ordered today  We will restart the atorvastatin at 40 mg daily  Otherwise, we will  notify the results make records accordingly    3.  Ascending aortic aneurysm  Stable, continue following up with your cardiac surgeon  I understand you have an appointment in April of this year  Please follow-up further protocol    4. Chest discomfort  PPI 20mg twice daily to see if this is possibly a GI issue  Please contact the office within the next few weeks to let us know tolerability/efficacy of this medication  Otherwise if chest pain occurs again please notify me immediately and/or go to the nearest emergency room or even discuss this with your cardiologist    5. Tennis Elbow  I wrote recommend purchasing a tennis elbow brace to wear constantly  Also please perform exercises that we discussed at today's visit  If this continues to be an issue, we will discuss cortisone injections    Otherwise, please follow-up on an as-needed basis and/or in 6 months for your physical examination

## 2024-03-22 ENCOUNTER — HOSPITAL ENCOUNTER (OUTPATIENT)
Dept: RADIOLOGY | Facility: CLINIC | Age: 59
Discharge: HOME | End: 2024-03-22
Payer: COMMERCIAL

## 2024-03-22 DIAGNOSIS — I71.21 ANEURYSM OF THE ASCENDING AORTA, WITHOUT RUPTURE (CMS-HCC): ICD-10-CM

## 2024-03-22 PROCEDURE — 71250 CT THORAX DX C-: CPT

## 2024-03-22 PROCEDURE — 71250 CT THORAX DX C-: CPT | Performed by: RADIOLOGY

## 2024-04-03 ENCOUNTER — APPOINTMENT (OUTPATIENT)
Dept: CARDIAC SURGERY | Facility: CLINIC | Age: 59
End: 2024-04-03
Payer: COMMERCIAL

## 2024-04-09 PROBLEM — K57.92 DIVERTICULITIS OF INTESTINE: Status: ACTIVE | Noted: 2024-04-09

## 2024-04-09 PROBLEM — I77.819 AORTIC ECTASIA (CMS-HCC): Status: ACTIVE | Noted: 2023-05-08

## 2024-04-09 PROBLEM — I47.10 PAROXYSMAL SVT (SUPRAVENTRICULAR TACHYCARDIA) (CMS-HCC): Status: ACTIVE | Noted: 2024-04-09

## 2024-04-09 PROBLEM — K64.4 SKIN TAGS, ANUS OR RECTUM: Status: ACTIVE | Noted: 2024-04-09

## 2024-04-09 PROBLEM — K60.2 ANAL FISSURE: Status: ACTIVE | Noted: 2024-04-09

## 2024-04-09 PROBLEM — I25.10 CALCIFICATION OF CORONARY ARTERY: Status: ACTIVE | Noted: 2024-04-09

## 2024-04-09 PROBLEM — D64.9 ANEMIA: Status: ACTIVE | Noted: 2024-04-09

## 2024-04-09 PROBLEM — L05.91 PILONIDAL CYST: Status: ACTIVE | Noted: 2024-04-09

## 2024-04-09 PROBLEM — N52.9 ERECTILE DYSFUNCTION: Status: ACTIVE | Noted: 2024-04-09

## 2024-04-09 PROBLEM — M54.6 ACUTE THORACIC BACK PAIN: Status: ACTIVE | Noted: 2024-04-09

## 2024-04-09 PROBLEM — M25.511 ACUTE PAIN OF RIGHT SHOULDER: Status: ACTIVE | Noted: 2024-04-09

## 2024-04-09 PROBLEM — R63.5 WEIGHT GAIN: Status: ACTIVE | Noted: 2024-04-09

## 2024-04-09 PROBLEM — R73.03 PREDIABETES: Status: ACTIVE | Noted: 2024-04-09

## 2024-04-09 PROBLEM — I25.10 ATHEROSCLEROTIC HEART DISEASE OF NATIVE CORONARY ARTERY WITHOUT ANGINA PECTORIS: Status: ACTIVE | Noted: 2024-04-09

## 2024-04-09 PROBLEM — K65.4 SCLEROSING MESENTERITIS (MULTI): Status: ACTIVE | Noted: 2024-04-09

## 2024-04-09 PROBLEM — E66.01 MORBID OBESITY (MULTI): Status: ACTIVE | Noted: 2024-04-09

## 2024-04-09 PROBLEM — M77.10 LATERAL EPICONDYLITIS: Status: ACTIVE | Noted: 2024-04-09

## 2024-04-09 PROBLEM — I10 BENIGN ESSENTIAL HYPERTENSION: Status: ACTIVE | Noted: 2023-05-08

## 2024-04-09 PROBLEM — R73.9 HYPERGLYCEMIA: Status: ACTIVE | Noted: 2024-04-09

## 2024-04-09 PROBLEM — K58.9 IBS (IRRITABLE BOWEL SYNDROME): Status: ACTIVE | Noted: 2024-04-09

## 2024-04-09 PROBLEM — K64.9 HEMORRHOIDS: Status: ACTIVE | Noted: 2024-04-09

## 2024-04-09 PROBLEM — I25.84 CALCIFICATION OF CORONARY ARTERY: Status: ACTIVE | Noted: 2024-04-09

## 2024-04-09 PROBLEM — K62.5 RECTAL BLEEDING: Status: ACTIVE | Noted: 2024-04-09

## 2024-04-16 RX ORDER — SILDENAFIL 100 MG/1
100 TABLET, FILM COATED ORAL AS NEEDED
COMMUNITY
Start: 2016-08-17

## 2024-04-16 RX ORDER — METHOCARBAMOL 500 MG/1
500 TABLET, FILM COATED ORAL 3 TIMES DAILY
COMMUNITY
Start: 2024-02-08

## 2024-04-16 NOTE — PROGRESS NOTES
Contacting Kendall for aorta follow up status post ct scan 3/22/24. Cinda Mckinney RN    This is 58 years old male patient who is very well-known by us we have been following him because of a dilated ascending aorta.  The Noncon CT today shows no really significant changes maybe a millimeter bigger.  Will continue to follow, we recommended as usual maintain blood pressure control and avoid heavy lifting, we will see him in 1 more year.

## 2024-04-17 ENCOUNTER — TELEMEDICINE (OUTPATIENT)
Dept: CARDIAC SURGERY | Facility: CLINIC | Age: 59
End: 2024-04-17
Payer: COMMERCIAL

## 2024-04-17 DIAGNOSIS — I71.21 ANEURYSM OF ASCENDING AORTA WITHOUT RUPTURE (CMS-HCC): ICD-10-CM

## 2024-04-17 PROCEDURE — 99214 OFFICE O/P EST MOD 30 MIN: CPT | Performed by: THORACIC SURGERY (CARDIOTHORACIC VASCULAR SURGERY)

## 2024-04-18 DIAGNOSIS — I77.810 MILD ASCENDING AORTA DILATATION (CMS-HCC): ICD-10-CM

## 2024-04-19 ENCOUNTER — OFFICE VISIT (OUTPATIENT)
Dept: CARDIOLOGY | Facility: CLINIC | Age: 59
End: 2024-04-19
Payer: COMMERCIAL

## 2024-04-19 VITALS
WEIGHT: 315 LBS | RESPIRATION RATE: 16 BRPM | HEART RATE: 72 BPM | OXYGEN SATURATION: 94 % | BODY MASS INDEX: 49.81 KG/M2

## 2024-04-19 DIAGNOSIS — I71.21 ANEURYSM OF ASCENDING AORTA WITHOUT RUPTURE (CMS-HCC): Primary | ICD-10-CM

## 2024-04-19 DIAGNOSIS — E78.5 HYPERLIPIDEMIA, UNSPECIFIED HYPERLIPIDEMIA TYPE: ICD-10-CM

## 2024-04-19 DIAGNOSIS — I25.84 CALCIFICATION OF CORONARY ARTERY: ICD-10-CM

## 2024-04-19 DIAGNOSIS — I10 BENIGN ESSENTIAL HYPERTENSION: ICD-10-CM

## 2024-04-19 DIAGNOSIS — I10 ESSENTIAL (PRIMARY) HYPERTENSION: ICD-10-CM

## 2024-04-19 DIAGNOSIS — I25.10 CALCIFICATION OF CORONARY ARTERY: ICD-10-CM

## 2024-04-19 PROCEDURE — 1036F TOBACCO NON-USER: CPT | Performed by: STUDENT IN AN ORGANIZED HEALTH CARE EDUCATION/TRAINING PROGRAM

## 2024-04-19 PROCEDURE — 99213 OFFICE O/P EST LOW 20 MIN: CPT | Performed by: STUDENT IN AN ORGANIZED HEALTH CARE EDUCATION/TRAINING PROGRAM

## 2024-04-19 RX ORDER — METOPROLOL SUCCINATE 50 MG/1
25 TABLET, EXTENDED RELEASE ORAL DAILY
Qty: 90 TABLET | Refills: 1 | Status: SHIPPED | OUTPATIENT
Start: 2024-04-19

## 2024-04-19 ASSESSMENT — ENCOUNTER SYMPTOMS
PSYCHIATRIC NEGATIVE: 1
HEMATOLOGIC/LYMPHATIC NEGATIVE: 1
MUSCULOSKELETAL NEGATIVE: 1
ALLERGIC/IMMUNOLOGIC NEGATIVE: 1
NEAR-SYNCOPE: 0
ORTHOPNEA: 0
GASTROINTESTINAL NEGATIVE: 1
SYNCOPE: 0
EYES NEGATIVE: 1
NEUROLOGICAL NEGATIVE: 1
PND: 0
PALPITATIONS: 0
ENDOCRINE NEGATIVE: 1
RESPIRATORY NEGATIVE: 1
DYSPNEA ON EXERTION: 0

## 2024-04-19 NOTE — PATIENT INSTRUCTIONS
For your fatigue we will decrease metoprolol to 25 mg daily.     We will continue your other heart medications.     Please call my nurse Roro with any question; her contact is below.     We will see you back in cardiology clinic in ~ 6 months.     Thank you for your visit today. Please contact our office (via CloudFlarehart or phone) with any additional questions.     [facility] [place]    Roro RN/Clinic Nurse for:    Dr. Richar Garcia    [address], [address]  [place] [id number]    Phone: [phone] Press Option 5 then Option 3 to speak with the Clinic Nurse (Roro)    _____    To Reach:    Billing Questions -    [phone]  Scheduling / Rescheduling -  Option 1  Refills / Medication Requests -  Option 3  General Office / [place] -  Option 4  Results -     Option 6  Medical Records -    Option 7  Repeat Options -    Option 9

## 2024-04-19 NOTE — PROGRESS NOTES
[facility] Cardiology Outpatient Follow-up Visit     Reason for Visit: follow-up for mild ascending aorta enlargement     HPI: Kendall Watson is a 58 y.o.  male  who returns for a follow-up for HTN, TAA. Past medical history of mild ascending aortic enlargement, hypertension, dyslipidemia, morbid obesity, obstructive sleep apnea, hx of paroxysmal SVT, former smoker, history of heavy alcohol use, history of prior knee surgery.     Patient was seen in cardiothoracic surgery clinic on April 5, 2023 by Dr. Cowart. CT imaging of the time showed stable enlargement of the ascending aorta. No surgical intervention indicated at this time repeat imaging of the aorta recommended in 2 years with CT chest or MRI. After his visit in April patient later had episode of substernal chest pain without radiation. Episode lasted for minutes and resolved spontaneously. No further episodes since. Patient was encouraged by primary care to reestablish care with cardiology.     Patient presented cardiology clinic on 6/30/2023. No further episodes of chest pain. Patient's cardiovascular risk factors include metabolic syndrome, hypertension, dyslipidemia, family history of atherosclerotic heart disease, and former smoker. Prior stress echo in November 2020 was low risk for ischemia. Patient is very sedentary and has gained 30 pounds over the past 6 months.      Patient presented to cardiology clinic on 4/19/2024.  No new cardiac complaints at this time.  No recent chest pains.  Tolerating physical activity without active complaints.  Patient has been biking on the toe path.  Recent chest CTA showed ascending aorta 4.3 cm in diameter; previously was 4.2 cm in 2023.  Coronary artery calcifications were again redemonstrated.  Exercise stress ECG was low risk for ischemia in August 2023.  Patient notes generalized fatigue, possibly secondary to beta-blockade we will decrease metoprolol XL to 25 mg daily.      Past Medical History:   - As  above    Surgical History:   He has a past surgical history that includes Knee surgery (06/12/2013) and Shoulder surgery (06/12/2013).    Family History:   Family History   Problem Relation Name Age of Onset    Coronary artery disease Father      Diabetes Father      Diabetes Sister         Allergies:  Patient has no known allergies.     Social History:   former smoker, quit in remote past; history of heavy alcohol consumption; no illicit drug use     Prior Cardiovascular Testing (Personally Reviewed):     CT chest (3/22/2024)  1.  Stable to mild interval increased aneurysmal dilatation of the  ascending thoracic aorta measuring up to 4.3 cm on current exam,  previously measuring 4.2 cm on exam dated 04/03/2023. Although  evaluation is limited on this noncontrast non gated examination.  2. No acute cardiopulmonary process.    Stress test (8/22/2023)  1. No clinical, electrocardiographic or diagnostic echocardiographic evidence for ischemia at a maximal workload.  2. The resting ejection fraction was estimated at 65% with a peak exercise ejection fraction estimated at 65 to 70%.  3. Adequate level of stress achieved.    EKG (6/30/2023)- in office, personally interpreted; normal sinus rhythm, left axis deviation     CT angio chest (April 2023) stable aneurysmal dilatation of the thoracic aorta; recommended continued surveillance with 2-year contrast chest CT or MRI; mid ascending aortic maximal dimensions 4.2 x 4.2 cm     Patient with stable enlargement of the ascending aorta per repeat CTA April 2023. Patient had interval episode of transient chest pain in the setting of multiple cardiovascular risk factors including hypertension, dyslipidemia, morbid obesity, and former tobacco use. Discussed the option of wait-and-see versus repeat cardiovascular stress testing, patient agreeable to proceed with exercise stress echo.     Exercise stress echo 8/22/2023 was low risk for ischemia; adequate level of stress achieved no  ECG or echocardiographic evidence for ischemia.     Patient return to cardiology clinic on 9/6/2023. No further chest pains. Patient reported brief episode of SVT last week was limited in nature. Patient notes he is prior history of SVT, episodes are very infrequent. Discussed option of referral to EP if symptomatic; patient would like to defer for now. Per home blood pressure hypertension is well controlledâ€“Home blood pressure readings 120s to 130s/60s mmHg. Recently bought a house, moving > so life has been a bit stressful. Patient notes intermittent muscle pains he is unsure if this is related to statin therapy. He has had interval improvement in lipid control on atorvastatin. Currently at goal. We discussed option of switching to rosuvastatin if he continues to have myalgias. He would like to continue atorvastatin for now.     Lipid panel (May 2023) total cholesterol 127, HDL 45.7, LDL 64, triglycerides 86 mg/dL     Echocardiogram (February 2022) left ventricular systolic function is normal with a left ventricular ejection fraction of 60 to 65%; aortic root measured 3.8 cm     CT cardiac scoring October 2020) left main 0; ; left circumflex 12; ; total 469      Review of Systems:  Review of Systems   Constitutional: Positive for malaise/fatigue.   HENT: Negative.     Eyes: Negative.    Cardiovascular:  Negative for chest pain, dyspnea on exertion, near-syncope, orthopnea, palpitations, paroxysmal nocturnal dyspnea and syncope.   Respiratory: Negative.     Endocrine: Negative.    Hematologic/Lymphatic: Negative.    Skin: Negative.    Musculoskeletal: Negative.    Gastrointestinal: Negative.    Genitourinary: Negative.    Neurological: Negative.    Psychiatric/Behavioral: Negative.     Allergic/Immunologic: Negative.        Outpatient Medications:    Current Outpatient Medications:     atorvastatin (Lipitor) 40 mg tablet, Take 1 tablet (40 mg) by mouth once daily., Disp: 90 tablet, Rfl: 1     methocarbamol (Robaxin) 500 mg tablet, Take 1 tablet (500 mg) by mouth 3 times a day., Disp: , Rfl:     metoprolol succinate XL (Toprol-XL) 50 mg 24 hr tablet, Take 1 tablet (50 mg) by mouth once daily. Do not crush or chew., Disp: 90 tablet, Rfl: 1    olmesartan (BENIcar) 40 mg tablet, Take 1 tablet (40 mg) by mouth once daily., Disp: 90 tablet, Rfl: 1    omeprazole (PriLOSEC) 20 mg DR capsule, Take 1 capsule (20 mg) by mouth 2 times a day. Do not crush or chew., Disp: 60 capsule, Rfl: 1    sildenafil (Viagra) 100 mg tablet, Take 1 tablet (100 mg) by mouth if needed for erectile dysfunction., Disp: , Rfl:      Last Recorded Vitals  Pulse 72   Resp 16   Wt 144 kg (318 lb)   SpO2 94%   BMI 49.81 kg/m²     Physical Exam:    Physical Exam  Constitutional:       General: He is not in acute distress.     Appearance: He is obese.   HENT:      Head: Normocephalic.      Mouth/Throat:      Mouth: Mucous membranes are moist.   Eyes:      Extraocular Movements: Extraocular movements intact.      Conjunctiva/sclera: Conjunctivae normal.   Neck:      Vascular: No JVD.   Cardiovascular:      Rate and Rhythm: Normal rate and regular rhythm.      Heart sounds: No murmur heard.  Pulmonary:      Effort: Pulmonary effort is normal. No respiratory distress.      Breath sounds: Normal breath sounds.   Abdominal:      Palpations: Abdomen is soft.   Musculoskeletal:         General: No swelling.   Skin:     General: Skin is warm and dry.   Neurological:      General: No focal deficit present.      Mental Status: He is alert.      Cranial Nerves: No cranial nerve deficit.      Motor: No weakness.   Psychiatric:         Mood and Affect: Mood normal.         Behavior: Behavior normal.         Lab/Radiology/Diagnostic Review:    Labs    Lab Results   Component Value Date    GLUCOSE 110 (H) 05/13/2023    CALCIUM 9.4 05/13/2023     05/13/2023    K 4.8 05/13/2023    CO2 29 05/13/2023     05/13/2023    BUN 17 05/13/2023     "CREATININE 0.80 05/13/2023       Lab Results   Component Value Date    WBC 6.4 05/13/2023    HGB 12.7 (L) 05/13/2023    HCT 41.6 05/13/2023    MCV 98 05/13/2023     05/13/2023       Lab Results   Component Value Date    CHOL 127 05/13/2023    CHOL 163 09/20/2022    CHOL 172 12/08/2021     Lab Results   Component Value Date    HDL 45.7 05/13/2023    HDL 49.0 09/20/2022    HDL 48.7 12/08/2021     No results found for: \"LDLCALC\"  Lab Results   Component Value Date    TRIG 86 05/13/2023    TRIG 209 (H) 09/20/2022    TRIG 112 12/08/2021       Lab Results   Component Value Date    TSH 1.86 05/13/2023       Assessment:   58 y.o.  male  who returns for a follow-up for HTN, TAA. Past medical history of mild ascending aortic enlargement, hypertension, dyslipidemia, morbid obesity, obstructive sleep apnea, hx of paroxysmal SVT, former smoker, history of heavy alcohol use, history of prior knee surgery.    Patient presented to cardiology clinic on 4/19/2024.  No new cardiac complaints at this time.  No recent chest pains.  Tolerating physical activity without active complaints.  Patient has been biking on the toe path.  Recent chest CTA showed ascending aorta 4.3 cm in diameter; previously was 4.2 cm in 2023.  Coronary artery calcifications were again redemonstrated.  Exercise stress ECG was low risk for ischemia in August 2023.  Patient notes generalized fatigue, possibly secondary to beta-blockade we will decrease metoprolol XL to 25 mg daily.    Overall Plan:  1. Transient chest pain with multiple cardiovascular risk factors as above (resolved)- no further episodes of chest pain; exercise stress echo was low risk for ischemia; no further testing recommended this time     2. Mild ascending aortic enlargement (4.3 per CTA April 2024)  - monitor clinically and with serial imaging; repeat CTA vs MRA in ~ 1-2 years  - hypertension management as below; continue statin  - encourage patient to remain tobacco free; " encouraged heart healthy diet including Mediterranean style diet     3. Hypertension (goal blood pressure less than 130/90 mmHg)  - decrease metoprolol succinate 25 mg daily to to fatigue  - continue olmesartan 40 mg daily, up-titrate as tolerated to goal   - patient instructed to keep home BP log     4. Dyslipidemia; history of atherosclerotic heart disease per elevated coronary calcium score (goal LDL less than 70 mg/dL; currently at goal)   - continue atorvastatin > if myalgias are bothersome would then stop atorvastatin and switch to rosuvastatin 20 mg nightly  - discussed dietary and lifestyle modifications; encouraged weight loss and regular physical activity     5. Morbid obesity  - strongly advised need for weight loss; discussed option of referral to bariatric medicine > patient would like to try dietary and lifestyle modifications first; continue dietary lifestyle modifications; continue treatment for sleep apnea; encouraged regular physical activity     6. Discussed red flag symptoms that should prompt immediate medical attention, patient verbalized understanding    Disposition- follow-up in ~ 6 months regarding HTN, generalized fatigue    Thank you for your visit today. Please contact our office with any questions.     Mauro Mcqueen MD

## 2024-06-03 ENCOUNTER — OFFICE VISIT (OUTPATIENT)
Dept: PRIMARY CARE | Facility: CLINIC | Age: 59
End: 2024-06-03
Payer: COMMERCIAL

## 2024-06-03 VITALS
BODY MASS INDEX: 47.18 KG/M2 | HEIGHT: 67 IN | OXYGEN SATURATION: 96 % | SYSTOLIC BLOOD PRESSURE: 130 MMHG | WEIGHT: 300.6 LBS | DIASTOLIC BLOOD PRESSURE: 72 MMHG | HEART RATE: 90 BPM

## 2024-06-03 DIAGNOSIS — R07.89 CHEST DISCOMFORT: ICD-10-CM

## 2024-06-03 DIAGNOSIS — E78.5 HYPERLIPIDEMIA, UNSPECIFIED HYPERLIPIDEMIA TYPE: ICD-10-CM

## 2024-06-03 DIAGNOSIS — I10 ESSENTIAL (PRIMARY) HYPERTENSION: ICD-10-CM

## 2024-06-03 DIAGNOSIS — Z00.00 HEALTHCARE MAINTENANCE: Primary | ICD-10-CM

## 2024-06-03 DIAGNOSIS — I10 HYPERTENSION, UNSPECIFIED TYPE: ICD-10-CM

## 2024-06-03 DIAGNOSIS — I71.21 ANEURYSM OF ASCENDING AORTA WITHOUT RUPTURE (CMS-HCC): ICD-10-CM

## 2024-06-03 DIAGNOSIS — R00.0 TACHYCARDIA: ICD-10-CM

## 2024-06-03 DIAGNOSIS — E78.5 HYPERLIPIDEMIA, UNSPECIFIED: ICD-10-CM

## 2024-06-03 LAB
POC APPEARANCE, URINE: CLEAR
POC BILIRUBIN, URINE: NEGATIVE
POC BLOOD, URINE: NEGATIVE
POC COLOR, URINE: YELLOW
POC GLUCOSE, URINE: NEGATIVE MG/DL
POC KETONES, URINE: NEGATIVE MG/DL
POC LEUKOCYTES, URINE: NEGATIVE
POC NITRITE,URINE: NEGATIVE
POC PH, URINE: 6 PH
POC PROTEIN, URINE: NEGATIVE MG/DL
POC SPECIFIC GRAVITY, URINE: 1.02
POC UROBILINOGEN, URINE: 0.2 EU/DL

## 2024-06-03 PROCEDURE — 1036F TOBACCO NON-USER: CPT | Performed by: STUDENT IN AN ORGANIZED HEALTH CARE EDUCATION/TRAINING PROGRAM

## 2024-06-03 PROCEDURE — 99213 OFFICE O/P EST LOW 20 MIN: CPT | Performed by: STUDENT IN AN ORGANIZED HEALTH CARE EDUCATION/TRAINING PROGRAM

## 2024-06-03 PROCEDURE — 3078F DIAST BP <80 MM HG: CPT | Performed by: STUDENT IN AN ORGANIZED HEALTH CARE EDUCATION/TRAINING PROGRAM

## 2024-06-03 PROCEDURE — 3075F SYST BP GE 130 - 139MM HG: CPT | Performed by: STUDENT IN AN ORGANIZED HEALTH CARE EDUCATION/TRAINING PROGRAM

## 2024-06-03 PROCEDURE — 99396 PREV VISIT EST AGE 40-64: CPT | Performed by: STUDENT IN AN ORGANIZED HEALTH CARE EDUCATION/TRAINING PROGRAM

## 2024-06-03 PROCEDURE — 81002 URINALYSIS NONAUTO W/O SCOPE: CPT | Performed by: STUDENT IN AN ORGANIZED HEALTH CARE EDUCATION/TRAINING PROGRAM

## 2024-06-03 RX ORDER — ATORVASTATIN CALCIUM 40 MG/1
40 TABLET, FILM COATED ORAL DAILY
Qty: 90 TABLET | Refills: 1 | Status: SHIPPED | OUTPATIENT
Start: 2024-06-03

## 2024-06-03 RX ORDER — OLMESARTAN MEDOXOMIL 40 MG/1
40 TABLET ORAL DAILY
Qty: 90 TABLET | Refills: 1 | Status: SHIPPED | OUTPATIENT
Start: 2024-06-03

## 2024-06-03 ASSESSMENT — PATIENT HEALTH QUESTIONNAIRE - PHQ9
SUM OF ALL RESPONSES TO PHQ9 QUESTIONS 1 AND 2: 0
1. LITTLE INTEREST OR PLEASURE IN DOING THINGS: NOT AT ALL
2. FEELING DOWN, DEPRESSED OR HOPELESS: NOT AT ALL

## 2024-06-03 NOTE — PROGRESS NOTES
Subjective   Patient ID: Kendall Watson is a 58 y.o. male who presents for Hypertension and Ankle Concerns.  Today he is accompanied by alone.         1. Health Maintenance  Overall patient is doing well.   Immunization: Tdap 2020  Shingles vaccine up-to-date x 2  COVID up-to-date x 2  Colon Cancer Screening: No family history, performed 2016, due 2026  Diet: Attempting to eat a well-balanced diet  Exercise: Attempting to exercise regularly via pickleball and bike   Tobacco: Denies use  EtOH: Rarely/socially    2. HTN/Tachycardia  Currently taking Olmesartan 40 mg daily and Metoprolol ER 25 mg daily.   Denies shortness of breath, headaches, leg pain or edema.  Does not check his blood pressure regularly at home  Blood pressure today was initially elevated but repeat was within normal limits....    3.  HLD  Currently taking atorvastatin 40 mg daily.  Denies any side effect from the medication  Last lab work was at 127, HDL 45, LDL 64, triglycerides 86  Requesting refills and willing to repeat lab work    4. Chest discomfort  Had a history of chest pain/discomfort  Ultimately followed up with cardiology and in the past had a stress test that was completely normal  Currently has no further signs/symptoms at this time    5. Ascending Aortic Aneurysm   Saw his cardiothoracic surgeon on 4/17/2024  At that time he had a CT scan performed and showed no major changing  It was recommended that they will continue monitoring every year      Current Outpatient Medications on File Prior to Visit   Medication Sig Dispense Refill    methocarbamol (Robaxin) 500 mg tablet Take 1 tablet (500 mg) by mouth 3 times a day.      metoprolol succinate XL (Toprol-XL) 50 mg 24 hr tablet Take 0.5 tablets (25 mg) by mouth once daily. Do not crush or chew. 90 tablet 1    omeprazole (PriLOSEC) 20 mg DR capsule Take 1 capsule (20 mg) by mouth 2 times a day. Do not crush or chew. 60 capsule 1    sildenafil (Viagra) 100 mg tablet Take 1 tablet (100  "mg) by mouth if needed for erectile dysfunction.      [DISCONTINUED] atorvastatin (Lipitor) 40 mg tablet Take 1 tablet (40 mg) by mouth once daily. 90 tablet 1    [DISCONTINUED] olmesartan (BENIcar) 40 mg tablet Take 1 tablet (40 mg) by mouth once daily. 90 tablet 1     No current facility-administered medications on file prior to visit.        No Known Allergies    Immunization History   Administered Date(s) Administered    Flu vaccine (IIV4), preservative free *Check age/dose* 10/26/2022    Flu vaccine, quadrivalent, recombinant, preservative free, adult (FLUBLOK) 11/19/2019    Hepatitis A vaccine, age 19 years and greater (HAVRIX) 08/13/2019    Influenza, Unspecified 10/26/2022    Moderna SARS-CoV-2 Vaccination 02/28/2021, 04/01/2021    Tdap vaccine, age 7 year and older (BOOSTRIX, ADACEL) 08/13/2019, 10/28/2020    Zoster vaccine, recombinant, adult (SHINGRIX) 10/26/2022, 05/08/2023         Review of Systems  All pertinent positive symptoms are included in the history of present illness.  All other systems have been reviewed and are negative and noncontributory to this patient's current ailments.     Objective   /72 (BP Location: Left arm, Patient Position: Sitting, BP Cuff Size: Large adult)   Pulse 90   Ht 1.702 m (5' 7\")   Wt 136 kg (300 lb 9.6 oz)   SpO2 96%   BMI 47.08 kg/m²   BSA: 2.54 meters squared  Office Visit on 06/03/2024   Component Date Value Ref Range Status    POC Color, Urine 06/03/2024 Yellow  Straw, Yellow, Light-Yellow Final    POC Appearance, Urine 06/03/2024 Clear  Clear Final    POC Glucose, Urine 06/03/2024 NEGATIVE  NEGATIVE mg/dl Final    POC Bilirubin, Urine 06/03/2024 NEGATIVE  NEGATIVE Final    POC Ketones, Urine 06/03/2024 NEGATIVE  NEGATIVE mg/dl Final    POC Specific Gravity, Urine 06/03/2024 1.020  1.005 - 1.035 Final    POC Blood, Urine 06/03/2024 NEGATIVE  NEGATIVE Final    POC PH, Urine 06/03/2024 6.0  No Reference Range Established PH Final    POC Protein, Urine " 06/03/2024 NEGATIVE  NEGATIVE, 30 (1+) mg/dl Final    POC Urobilinogen, Urine 06/03/2024 0.2  0.2, 1.0 EU/DL Final    Poc Nitrite, Urine 06/03/2024 NEGATIVE  NEGATIVE Final    POC Leukocytes, Urine 06/03/2024 NEGATIVE  NEGATIVE Final       Physical Exam  CONSTITUTIONAL - obese, well developed, looks like stated age, in no acute distress, not ill-appearing, and not tired appearing  SKIN -darkening of skin in the lower extremities, normal skin turgor without rash, lesions, or nodules visualized  HEAD - no trauma, normocephalic  EYES - normal external exam  ENT - TM's intact, no injection, no signs of infection, uvula midline, normal tongue movement and throat normal, no exudate  NECK - supple without rigidity, no neck mass was observed, no thyromegaly or thyroid nodules  CHEST - clear to auscultation, no wheezing, no crackles and no rales, good effort  CARDIAC - regular rate and regular rhythm, no skipped beats, no murmur  ABDOMEN - no organomegaly, soft, nontender, nondistended  EXTREMITIES -1+ pitting edema, slight discoloration of the lower skin which appears to be chronic venous stasis  NEUROLOGICAL - normal gait, normal balance, normal motor, no ataxia  PSYCHIATRIC - alert, pleasant and cordial, age-appropriate  IMMUNOLOGIC - no cervical lymphadenopathy     Assessment/Plan   1. Health Maintenance  Complete history and physical examination was performed  EKG was not performed at today's visit  We will notify of test results once available and make treatment recommendations accordingly    2. HTN/Tachycardia  Stable, continue olmesartan 40 mg daily and metoprolol 25 ER mg daily.  Blood pressure goal should be below 130/80, ideally 120/80  Please check your blood pressure at home and keep a log.  Please bring this log with you to your next appointment    3. HLD  Lipid panel was ordered today.  We will notify you of the results and make recommendations accordingly.  In the meantime continue atorvastatin 40 mg  daily  Refill sent to your pharmacy    4. Chest discomfort  Continue to follow-up with cardiology per their protocol  Negative stress test noted in the past  If this continues, we may need to further evaluate for worsening acid reflux    5. Ascending Aortic Aneurysm   Stable, recommend to continue with your cardiac surgeon secondary to your preestablished protocol.  Otherwise, it appears that the aortic aneurysm is currently stable at this time      Follow-up in 6 months for medication check.

## 2024-06-08 ENCOUNTER — LAB (OUTPATIENT)
Dept: LAB | Facility: LAB | Age: 59
End: 2024-06-08
Payer: COMMERCIAL

## 2024-06-08 DIAGNOSIS — Z00.00 HEALTHCARE MAINTENANCE: ICD-10-CM

## 2024-06-08 PROCEDURE — 80061 LIPID PANEL: CPT

## 2024-06-08 PROCEDURE — 36415 COLL VENOUS BLD VENIPUNCTURE: CPT

## 2024-06-08 PROCEDURE — 80053 COMPREHEN METABOLIC PANEL: CPT

## 2024-06-08 PROCEDURE — 83036 HEMOGLOBIN GLYCOSYLATED A1C: CPT

## 2024-06-08 PROCEDURE — 84153 ASSAY OF PSA TOTAL: CPT

## 2024-06-08 PROCEDURE — 84443 ASSAY THYROID STIM HORMONE: CPT

## 2024-06-08 PROCEDURE — 85025 COMPLETE CBC W/AUTO DIFF WBC: CPT

## 2024-06-09 LAB
ALBUMIN SERPL BCP-MCNC: 4.4 G/DL (ref 3.4–5)
ALP SERPL-CCNC: 72 U/L (ref 33–120)
ALT SERPL W P-5'-P-CCNC: 26 U/L (ref 10–52)
ANION GAP SERPL CALC-SCNC: 15 MMOL/L (ref 10–20)
AST SERPL W P-5'-P-CCNC: 27 U/L (ref 9–39)
BASOPHILS # BLD AUTO: 0.06 X10*3/UL (ref 0–0.1)
BASOPHILS NFR BLD AUTO: 1 %
BILIRUB SERPL-MCNC: 0.7 MG/DL (ref 0–1.2)
BUN SERPL-MCNC: 24 MG/DL (ref 6–23)
CALCIUM SERPL-MCNC: 9.5 MG/DL (ref 8.6–10.6)
CHLORIDE SERPL-SCNC: 104 MMOL/L (ref 98–107)
CHOLEST SERPL-MCNC: 192 MG/DL (ref 0–199)
CHOLESTEROL/HDL RATIO: 4
CO2 SERPL-SCNC: 26 MMOL/L (ref 21–32)
CREAT SERPL-MCNC: 0.75 MG/DL (ref 0.5–1.3)
EGFRCR SERPLBLD CKD-EPI 2021: >90 ML/MIN/1.73M*2
EOSINOPHIL # BLD AUTO: 0.18 X10*3/UL (ref 0–0.7)
EOSINOPHIL NFR BLD AUTO: 3 %
ERYTHROCYTE [DISTWIDTH] IN BLOOD BY AUTOMATED COUNT: 13.8 % (ref 11.5–14.5)
EST. AVERAGE GLUCOSE BLD GHB EST-MCNC: 126 MG/DL
GLUCOSE SERPL-MCNC: 117 MG/DL (ref 74–99)
HBA1C MFR BLD: 6 %
HCT VFR BLD AUTO: 39.4 % (ref 41–52)
HDLC SERPL-MCNC: 47.6 MG/DL
HGB BLD-MCNC: 12.4 G/DL (ref 13.5–17.5)
IMM GRANULOCYTES # BLD AUTO: 0.03 X10*3/UL (ref 0–0.7)
IMM GRANULOCYTES NFR BLD AUTO: 0.5 % (ref 0–0.9)
LDLC SERPL CALC-MCNC: 119 MG/DL
LYMPHOCYTES # BLD AUTO: 1.96 X10*3/UL (ref 1.2–4.8)
LYMPHOCYTES NFR BLD AUTO: 32.7 %
MCH RBC QN AUTO: 30.5 PG (ref 26–34)
MCHC RBC AUTO-ENTMCNC: 31.5 G/DL (ref 32–36)
MCV RBC AUTO: 97 FL (ref 80–100)
MONOCYTES # BLD AUTO: 0.69 X10*3/UL (ref 0.1–1)
MONOCYTES NFR BLD AUTO: 11.5 %
NEUTROPHILS # BLD AUTO: 3.08 X10*3/UL (ref 1.2–7.7)
NEUTROPHILS NFR BLD AUTO: 51.3 %
NON HDL CHOLESTEROL: 144 MG/DL (ref 0–149)
NRBC BLD-RTO: 0 /100 WBCS (ref 0–0)
PLATELET # BLD AUTO: 206 X10*3/UL (ref 150–450)
POTASSIUM SERPL-SCNC: 4.6 MMOL/L (ref 3.5–5.3)
PROT SERPL-MCNC: 6.7 G/DL (ref 6.4–8.2)
PSA SERPL-MCNC: 0.41 NG/ML
RBC # BLD AUTO: 4.06 X10*6/UL (ref 4.5–5.9)
SODIUM SERPL-SCNC: 140 MMOL/L (ref 136–145)
TRIGL SERPL-MCNC: 125 MG/DL (ref 0–149)
TSH SERPL-ACNC: 3.04 MIU/L (ref 0.44–3.98)
VLDL: 25 MG/DL (ref 0–40)
WBC # BLD AUTO: 6 X10*3/UL (ref 4.4–11.3)

## 2024-10-25 ENCOUNTER — APPOINTMENT (OUTPATIENT)
Dept: CARDIOLOGY | Facility: CLINIC | Age: 59
End: 2024-10-25
Payer: COMMERCIAL

## 2024-10-25 VITALS
OXYGEN SATURATION: 96 % | RESPIRATION RATE: 16 BRPM | SYSTOLIC BLOOD PRESSURE: 149 MMHG | WEIGHT: 308 LBS | DIASTOLIC BLOOD PRESSURE: 73 MMHG | BODY MASS INDEX: 48.24 KG/M2 | HEART RATE: 92 BPM

## 2024-10-25 DIAGNOSIS — I71.21 ANEURYSM OF ASCENDING AORTA WITHOUT RUPTURE (CMS-HCC): ICD-10-CM

## 2024-10-25 DIAGNOSIS — I10 BENIGN ESSENTIAL HYPERTENSION: ICD-10-CM

## 2024-10-25 DIAGNOSIS — I25.10 CALCIFICATION OF CORONARY ARTERY: ICD-10-CM

## 2024-10-25 DIAGNOSIS — E78.5 HYPERLIPIDEMIA, UNSPECIFIED HYPERLIPIDEMIA TYPE: ICD-10-CM

## 2024-10-25 PROCEDURE — 99213 OFFICE O/P EST LOW 20 MIN: CPT | Performed by: STUDENT IN AN ORGANIZED HEALTH CARE EDUCATION/TRAINING PROGRAM

## 2024-10-25 PROCEDURE — 3077F SYST BP >= 140 MM HG: CPT | Performed by: STUDENT IN AN ORGANIZED HEALTH CARE EDUCATION/TRAINING PROGRAM

## 2024-10-25 PROCEDURE — 3078F DIAST BP <80 MM HG: CPT | Performed by: STUDENT IN AN ORGANIZED HEALTH CARE EDUCATION/TRAINING PROGRAM

## 2024-10-25 RX ORDER — ASPIRIN 81 MG/1
81 TABLET ORAL DAILY
COMMUNITY

## 2024-10-25 ASSESSMENT — ENCOUNTER SYMPTOMS
SYNCOPE: 0
EYES NEGATIVE: 1
PND: 0
PSYCHIATRIC NEGATIVE: 1
NEUROLOGICAL NEGATIVE: 1
MUSCULOSKELETAL NEGATIVE: 1
GASTROINTESTINAL NEGATIVE: 1
PALPITATIONS: 0
ORTHOPNEA: 0
NEAR-SYNCOPE: 0
ENDOCRINE NEGATIVE: 1
DYSPNEA ON EXERTION: 0
ALLERGIC/IMMUNOLOGIC NEGATIVE: 1
HEMATOLOGIC/LYMPHATIC NEGATIVE: 1
RESPIRATORY NEGATIVE: 1

## 2024-10-28 ASSESSMENT — ENCOUNTER SYMPTOMS: SHORTNESS OF BREATH: 0

## 2024-12-01 DIAGNOSIS — I10 HYPERTENSION, UNSPECIFIED TYPE: ICD-10-CM

## 2024-12-02 RX ORDER — OLMESARTAN MEDOXOMIL 40 MG/1
40 TABLET ORAL DAILY
Qty: 30 TABLET | Refills: 0 | Status: SHIPPED | OUTPATIENT
Start: 2024-12-02

## 2024-12-20 ENCOUNTER — APPOINTMENT (OUTPATIENT)
Dept: PRIMARY CARE | Facility: CLINIC | Age: 59
End: 2024-12-20
Payer: COMMERCIAL

## 2024-12-20 VITALS — HEART RATE: 102 BPM | HEIGHT: 67 IN | BODY MASS INDEX: 47.87 KG/M2 | WEIGHT: 305 LBS

## 2024-12-20 DIAGNOSIS — I10 HYPERTENSION, UNSPECIFIED TYPE: ICD-10-CM

## 2024-12-20 DIAGNOSIS — I10 ESSENTIAL (PRIMARY) HYPERTENSION: ICD-10-CM

## 2024-12-20 DIAGNOSIS — I71.21 ANEURYSM OF ASCENDING AORTA WITHOUT RUPTURE (CMS-HCC): ICD-10-CM

## 2024-12-20 DIAGNOSIS — R73.03 PREDIABETES: Primary | ICD-10-CM

## 2024-12-20 DIAGNOSIS — Z23 FLU VACCINE NEED: ICD-10-CM

## 2024-12-20 DIAGNOSIS — E78.5 HYPERLIPIDEMIA, UNSPECIFIED: ICD-10-CM

## 2024-12-20 PROCEDURE — 3008F BODY MASS INDEX DOCD: CPT | Performed by: STUDENT IN AN ORGANIZED HEALTH CARE EDUCATION/TRAINING PROGRAM

## 2024-12-20 PROCEDURE — 99214 OFFICE O/P EST MOD 30 MIN: CPT | Performed by: STUDENT IN AN ORGANIZED HEALTH CARE EDUCATION/TRAINING PROGRAM

## 2024-12-20 PROCEDURE — 90471 IMMUNIZATION ADMIN: CPT | Performed by: STUDENT IN AN ORGANIZED HEALTH CARE EDUCATION/TRAINING PROGRAM

## 2024-12-20 PROCEDURE — 90656 IIV3 VACC NO PRSV 0.5 ML IM: CPT | Performed by: STUDENT IN AN ORGANIZED HEALTH CARE EDUCATION/TRAINING PROGRAM

## 2024-12-20 RX ORDER — OLMESARTAN MEDOXOMIL 20 MG/1
20 TABLET ORAL DAILY
Qty: 90 TABLET | Refills: 1 | Status: SHIPPED | OUTPATIENT
Start: 2024-12-20

## 2024-12-20 RX ORDER — ATORVASTATIN CALCIUM 40 MG/1
40 TABLET, FILM COATED ORAL DAILY
Qty: 90 TABLET | Refills: 1 | Status: SHIPPED | OUTPATIENT
Start: 2024-12-20

## 2025-01-29 ENCOUNTER — TELEMEDICINE (OUTPATIENT)
Dept: PRIMARY CARE | Facility: CLINIC | Age: 60
End: 2025-01-29
Payer: COMMERCIAL

## 2025-01-29 DIAGNOSIS — R19.7 DIARRHEA, UNSPECIFIED TYPE: Primary | ICD-10-CM

## 2025-01-29 PROCEDURE — 99213 OFFICE O/P EST LOW 20 MIN: CPT | Performed by: STUDENT IN AN ORGANIZED HEALTH CARE EDUCATION/TRAINING PROGRAM

## 2025-01-29 PROCEDURE — 1036F TOBACCO NON-USER: CPT | Performed by: STUDENT IN AN ORGANIZED HEALTH CARE EDUCATION/TRAINING PROGRAM

## 2025-01-29 NOTE — PROGRESS NOTES
Subjective   Patient ID: Kendall Watson is a 59 y.o. male who presents for Food Poisoning.    HPI   Patient is calling into the office today to discuss signs/symptoms of diarrhea    Stated that he was at a party at his sister-in-law's house and stated that 17 out of 21 people recently called a GI illness your present at the party    States that he noted stomach cramps as well as diarrhea but denied any fevers, chills, vomiting, blood, or any other acute signs or symptoms    Stated that this all started Sunday night    Currently now stating that he has been using Imodium on an as-needed basis and noted that his diarrhea is slowing down      Review of Systems  All pertinent positive symptoms are included in the history of present illness.    All other systems have been reviewed and are negative and noncontributory to this patient's current ailments.    Objective   There were no vitals taken for this visit.    Physical Exam  CONSTITUTIONAL - well nourished, well developed, looks like stated age, in no acute distress, not ill-appearing, and not tired appearing  SKIN - normal skin color and pigmentation, normal skin turgor without rash, lesions, or nodules visualized  HEAD - no trauma, normocephalic  EYES - normal external exam  CHEST -no distressed breathing, good effort  EXTREMITIES - no edema, no deformities  NEUROLOGICAL - normal balance, normal motor, no ataxia  PSYCHIATRIC - alert, pleasant and cordial, age-appropriate    Assessment/Plan   We had a conversation about your signs/symptoms I truly believe that this is a viral etiology and is most likely due to [place]/norovirus    I did recommend supportive care at this time I recommend that you refrain from any Imodium or any antidiarrheal medications    I did recommend stool studies which you declined at this time    I would recommend continuing plenty of fluids and supportive measures at this time    At any point if you notice worsening or acute signs/symptoms  please notify me immediately and/or go to nearest emergency room    Briefly, you did discuss your BMI and discussing GLP-1 medication  I recommend you look into this with your insurance/pharmacy and I will gladly start this if you decide to do so

## 2025-02-13 LAB
ALBUMIN SERPL-MCNC: 4.6 G/DL (ref 3.6–5.1)
ALP SERPL-CCNC: 75 U/L (ref 35–144)
ALT SERPL-CCNC: 27 U/L (ref 9–46)
ANION GAP SERPL CALCULATED.4IONS-SCNC: 8 MMOL/L (CALC) (ref 7–17)
AST SERPL-CCNC: 30 U/L (ref 10–35)
BILIRUB SERPL-MCNC: 1 MG/DL (ref 0.2–1.2)
BUN SERPL-MCNC: 17 MG/DL (ref 7–25)
CALCIUM SERPL-MCNC: 9.5 MG/DL (ref 8.6–10.3)
CHLORIDE SERPL-SCNC: 101 MMOL/L (ref 98–110)
CHOLEST SERPL-MCNC: 163 MG/DL
CHOLEST/HDLC SERPL: 2.9 (CALC)
CO2 SERPL-SCNC: 30 MMOL/L (ref 20–32)
CREAT SERPL-MCNC: 0.83 MG/DL (ref 0.7–1.35)
EGFRCR SERPLBLD CKD-EPI 2021: 100 ML/MIN/1.73M2
EST. AVERAGE GLUCOSE BLD GHB EST-MCNC: 137 MG/DL
EST. AVERAGE GLUCOSE BLD GHB EST-SCNC: 7.6 MMOL/L
GLUCOSE SERPL-MCNC: 92 MG/DL (ref 65–99)
HBA1C MFR BLD: 6.4 % OF TOTAL HGB
HDLC SERPL-MCNC: 57 MG/DL
LDLC SERPL CALC-MCNC: 83 MG/DL (CALC)
NONHDLC SERPL-MCNC: 106 MG/DL (CALC)
POTASSIUM SERPL-SCNC: 4.5 MMOL/L (ref 3.5–5.3)
PROT SERPL-MCNC: 7.4 G/DL (ref 6.1–8.1)
SODIUM SERPL-SCNC: 139 MMOL/L (ref 135–146)
TRIGL SERPL-MCNC: 133 MG/DL

## 2025-02-13 NOTE — RESULT ENCOUNTER NOTE
Hemoglobin A1c still shows prediabetes but did increase up to 6.4%    Cholesterol looks good at 163, HDL 57, triglycerides 133, LDL 83    Sugar, kidneys, liver, electrolytes are all within normal limits

## 2025-02-17 ENCOUNTER — TELEPHONE (OUTPATIENT)
Dept: CARDIOLOGY | Facility: CLINIC | Age: 60
End: 2025-02-17
Payer: COMMERCIAL

## 2025-02-17 DIAGNOSIS — K21.9 GASTROESOPHAGEAL REFLUX DISEASE, UNSPECIFIED WHETHER ESOPHAGITIS PRESENT: Chronic | ICD-10-CM

## 2025-02-17 NOTE — TELEPHONE ENCOUNTER
Pt called to let Dr Mcqueen know that the chest discomfort he was experiencing is now persistent. Last seen on 10/25/24. Pt states that he isnt sure if related to acid reflux, muscle pain or heart. Exercise usually makes the discomfort better. Pt monitors BP and HR at home, states that metoprolol and olmesartan have been stopped because (since stopping coffee) BP has improved and HR is usually 70s-80s. Chest discomfort can occur at any time, comes and goes. When pt goes to the gym the discomfort resolves.     Next ov scheduled for 10/31/25.

## 2025-02-19 RX ORDER — PANTOPRAZOLE SODIUM 20 MG/1
20 TABLET, DELAYED RELEASE ORAL
Qty: 30 TABLET | Refills: 11 | Status: SHIPPED | OUTPATIENT
Start: 2025-02-19 | End: 2026-02-19

## 2025-02-19 NOTE — TELEPHONE ENCOUNTER
Pt notified. Pt agrees that it probably is not of cardiac cause, he feels better after exercising/going to the gym. He would like to try the pantoprazole. He will the office to schedule OV if symptoms dont resolve with pantoprazole.     Pended to Dr Mcqueen.

## 2025-04-07 ENCOUNTER — HOSPITAL ENCOUNTER (OUTPATIENT)
Dept: RADIOLOGY | Facility: CLINIC | Age: 60
End: 2025-04-07
Payer: COMMERCIAL

## 2025-04-09 ENCOUNTER — HOSPITAL ENCOUNTER (OUTPATIENT)
Dept: RADIOLOGY | Facility: CLINIC | Age: 60
End: 2025-04-09
Payer: COMMERCIAL

## 2025-04-16 LAB
ANION GAP SERPL CALCULATED.4IONS-SCNC: 9 MMOL/L (CALC) (ref 7–17)
BUN SERPL-MCNC: 22 MG/DL (ref 7–25)
BUN/CREAT SERPL: ABNORMAL (CALC) (ref 6–22)
CALCIUM SERPL-MCNC: 9.6 MG/DL (ref 8.6–10.3)
CHLORIDE SERPL-SCNC: 101 MMOL/L (ref 98–110)
CO2 SERPL-SCNC: 29 MMOL/L (ref 20–32)
CREAT SERPL-MCNC: 0.97 MG/DL (ref 0.7–1.35)
EGFRCR SERPLBLD CKD-EPI 2021: 89 ML/MIN/1.73M2
GLUCOSE SERPL-MCNC: 105 MG/DL (ref 65–99)
POTASSIUM SERPL-SCNC: 4.5 MMOL/L (ref 3.5–5.3)
SODIUM SERPL-SCNC: 139 MMOL/L (ref 135–146)

## 2025-04-18 ENCOUNTER — HOSPITAL ENCOUNTER (OUTPATIENT)
Dept: RADIOLOGY | Facility: CLINIC | Age: 60
Discharge: HOME | End: 2025-04-18
Payer: COMMERCIAL

## 2025-04-18 DIAGNOSIS — I77.810 MILD ASCENDING AORTA DILATATION: ICD-10-CM

## 2025-04-18 PROCEDURE — 71275 CT ANGIOGRAPHY CHEST: CPT

## 2025-04-18 PROCEDURE — 2550000001 HC RX 255 CONTRASTS: Performed by: THORACIC SURGERY (CARDIOTHORACIC VASCULAR SURGERY)

## 2025-04-18 RX ADMIN — IOHEXOL 90 ML: 350 INJECTION, SOLUTION INTRAVENOUS at 10:25

## 2025-04-23 ENCOUNTER — APPOINTMENT (OUTPATIENT)
Dept: CARDIAC SURGERY | Facility: HOSPITAL | Age: 60
End: 2025-04-23
Payer: COMMERCIAL

## 2025-04-29 PROBLEM — J01.40 ACUTE NON-RECURRENT PANSINUSITIS: Status: RESOLVED | Noted: 2023-10-26 | Resolved: 2025-04-29

## 2025-04-29 PROBLEM — K60.2 ANAL FISSURE: Status: RESOLVED | Noted: 2024-04-09 | Resolved: 2025-04-29

## 2025-04-29 PROBLEM — K57.92 DIVERTICULITIS OF INTESTINE: Status: RESOLVED | Noted: 2024-04-09 | Resolved: 2025-04-29

## 2025-04-29 PROBLEM — R63.5 WEIGHT GAIN: Status: RESOLVED | Noted: 2025-04-29 | Resolved: 2025-04-29

## 2025-04-29 PROBLEM — M25.511 ACUTE PAIN OF RIGHT SHOULDER: Status: RESOLVED | Noted: 2024-04-09 | Resolved: 2025-04-29

## 2025-04-29 PROBLEM — K64.9 HEMORRHOIDS: Status: RESOLVED | Noted: 2024-04-09 | Resolved: 2025-04-29

## 2025-04-29 NOTE — PROGRESS NOTES
Contacting Kendall for aorta follow up status post most recent ct 4/18/25. Cinda Mckinney RN    This is a 59 years old male patient last seen before.  I been following him because of a dilated ascending aorta.  He has a year of follow-up since the last time I had a virtual visit with him over the phone as well as some Daniella today.  So I went through the CT scan I did the measurement myself the ascending aorta is no more than 4.2 mm there is no significant changes compared to the 1 year before.  The patient mention to me that he essentially quit drinking coffee and with that he is feeling much better and he is not on need any blood pressure medication.  I suggest and encouraged him to go back to his primary physician to check with him about blood pressure medication do all the risk factor modification.  We are going to see him in 2 more year with a new CT scan.

## 2025-04-30 ENCOUNTER — TELEMEDICINE (OUTPATIENT)
Dept: CARDIAC SURGERY | Facility: HOSPITAL | Age: 60
End: 2025-04-30
Payer: COMMERCIAL

## 2025-04-30 DIAGNOSIS — I71.21 ANEURYSM OF ASCENDING AORTA WITHOUT RUPTURE: ICD-10-CM

## 2025-04-30 PROCEDURE — 99215 OFFICE O/P EST HI 40 MIN: CPT | Performed by: THORACIC SURGERY (CARDIOTHORACIC VASCULAR SURGERY)

## 2025-07-25 ENCOUNTER — APPOINTMENT (OUTPATIENT)
Dept: PRIMARY CARE | Facility: CLINIC | Age: 60
End: 2025-07-25
Payer: COMMERCIAL

## 2025-07-25 VITALS
HEART RATE: 101 BPM | HEIGHT: 67 IN | BODY MASS INDEX: 48.18 KG/M2 | OXYGEN SATURATION: 96 % | DIASTOLIC BLOOD PRESSURE: 70 MMHG | WEIGHT: 307 LBS | SYSTOLIC BLOOD PRESSURE: 156 MMHG

## 2025-07-25 DIAGNOSIS — I10 HYPERTENSION, UNSPECIFIED TYPE: ICD-10-CM

## 2025-07-25 DIAGNOSIS — K21.9 GASTROESOPHAGEAL REFLUX DISEASE, UNSPECIFIED WHETHER ESOPHAGITIS PRESENT: Chronic | ICD-10-CM

## 2025-07-25 DIAGNOSIS — Z13.0 SCREENING FOR DISORDER OF BLOOD AND BLOOD-FORMING ORGANS: ICD-10-CM

## 2025-07-25 DIAGNOSIS — Z13.29 SCREENING FOR THYROID DISORDER: ICD-10-CM

## 2025-07-25 DIAGNOSIS — E78.5 HYPERLIPIDEMIA, UNSPECIFIED: ICD-10-CM

## 2025-07-25 DIAGNOSIS — Z13.6 SCREENING FOR HEART DISEASE: ICD-10-CM

## 2025-07-25 DIAGNOSIS — Z13.1 SCREENING FOR DIABETES MELLITUS: ICD-10-CM

## 2025-07-25 DIAGNOSIS — Z12.5 PROSTATE CANCER SCREENING: ICD-10-CM

## 2025-07-25 DIAGNOSIS — Z00.00 HEALTHCARE MAINTENANCE: Primary | ICD-10-CM

## 2025-07-25 PROCEDURE — 99213 OFFICE O/P EST LOW 20 MIN: CPT | Performed by: STUDENT IN AN ORGANIZED HEALTH CARE EDUCATION/TRAINING PROGRAM

## 2025-07-25 PROCEDURE — 3008F BODY MASS INDEX DOCD: CPT | Performed by: STUDENT IN AN ORGANIZED HEALTH CARE EDUCATION/TRAINING PROGRAM

## 2025-07-25 PROCEDURE — 3078F DIAST BP <80 MM HG: CPT | Performed by: STUDENT IN AN ORGANIZED HEALTH CARE EDUCATION/TRAINING PROGRAM

## 2025-07-25 PROCEDURE — 3077F SYST BP >= 140 MM HG: CPT | Performed by: STUDENT IN AN ORGANIZED HEALTH CARE EDUCATION/TRAINING PROGRAM

## 2025-07-25 PROCEDURE — 99396 PREV VISIT EST AGE 40-64: CPT | Performed by: STUDENT IN AN ORGANIZED HEALTH CARE EDUCATION/TRAINING PROGRAM

## 2025-07-25 RX ORDER — ATORVASTATIN CALCIUM 40 MG/1
40 TABLET, FILM COATED ORAL DAILY
Qty: 90 TABLET | Refills: 1 | Status: SHIPPED | OUTPATIENT
Start: 2025-07-25

## 2025-07-25 RX ORDER — OLMESARTAN MEDOXOMIL 20 MG/1
20 TABLET ORAL DAILY
Qty: 90 TABLET | Refills: 1 | Status: SHIPPED | OUTPATIENT
Start: 2025-07-25

## 2025-07-25 RX ORDER — PANTOPRAZOLE SODIUM 20 MG/1
20 TABLET, DELAYED RELEASE ORAL
Qty: 30 TABLET | Refills: 11 | Status: SHIPPED | OUTPATIENT
Start: 2025-07-25 | End: 2026-07-25

## 2025-07-25 ASSESSMENT — PATIENT HEALTH QUESTIONNAIRE - PHQ9
SUM OF ALL RESPONSES TO PHQ9 QUESTIONS 1 AND 2: 0
2. FEELING DOWN, DEPRESSED OR HOPELESS: NOT AT ALL
1. LITTLE INTEREST OR PLEASURE IN DOING THINGS: NOT AT ALL

## 2025-07-25 NOTE — PROGRESS NOTES
Subjective   Patient ID: Kendall Watson is a 60 y.o. male who presents for Annual Exam.  Today he is accompanied by alone.         1. Health Maintenance  Overall patient is doing well.   Immunization: Tdap 2020  Shingles vaccine up-to-date x 2  COVID up-to-date x 2  Colon Cancer Screening: No family history, performed 2016, due 2026  Diet: Attempting to eat a well-balanced diet  Exercise: Attempting to exercise regularly via pickleball and bike   Tobacco: Denies use  EtOH: Rarely/socially    2. HTN/Tachycardia  Currently not taking any blood pressure medication but blood pressure is slightly high  Willing and wishing to restart olmesartan but at a lower dose as he was getting some hypotensive episodes in the past  Denies shortness of breath, headaches, leg pain or edema.  Does not check his blood pressure regularly at home    3.  HLD  Currently taking atorvastatin 40 mg daily.  Denies any side effect from the medication  Willing and wishing to repeat lab work in the near future    4. Chest discomfort  Had a history of chest pain/discomfort  Ultimately followed up with cardiology and in the past had a stress test that was completely normal  Currently has no further signs/symptoms at this time  Continues to follow-up with cardiology yearly    5. Ascending Aortic Aneurysm   Continues to follow-up with the cardiothoracic surgeon  Recently had another CT angio and has been given a 2-year clearance, due 2027      Current Outpatient Medications on File Prior to Visit   Medication Sig Dispense Refill    aspirin 81 mg EC tablet Take 1 tablet (81 mg) by mouth once daily.      atorvastatin (Lipitor) 40 mg tablet Take 1 tablet (40 mg) by mouth once daily. 90 tablet 1    pantoprazole (Protonix) 20 mg EC tablet Take 1 tablet (20 mg) by mouth once daily in the morning. Take before meals. Do not crush, chew, or split. 30 tablet 11     No current facility-administered medications on file prior to visit.        No Known  "Allergies    Immunization History   Administered Date(s) Administered    Flu vaccine (IIV4), preservative free *Check age/dose* 02/16/2017, 10/26/2022    Flu vaccine, quadrivalent, recombinant, preservative free, adult (FLUBLOK) 11/19/2019    Flu vaccine, trivalent, preservative free, age 6 months and greater (Fluarix/Fluzone/Flulaval) 12/20/2024    Hepatitis A vaccine, age 19 years and greater (HAVRIX) 08/13/2019    Influenza, Unspecified 10/26/2022    Influenza, seasonal, intradermal, preservative free 01/10/2013    Moderna SARS-CoV-2 Vaccination 02/28/2021, 04/01/2021    Tdap vaccine, age 7 year and older (BOOSTRIX, ADACEL) 08/13/2019, 10/28/2020    Zoster vaccine, recombinant, adult (SHINGRIX) 10/26/2022, 05/08/2023         Review of Systems  All pertinent positive symptoms are included in the history of present illness.  All other systems have been reviewed and are negative and noncontributory to this patient's current ailments.     Objective   /70 (BP Location: Left arm, Patient Position: Sitting, BP Cuff Size: Large adult)   Pulse 101   Ht 1.702 m (5' 7\")   Wt 139 kg (307 lb)   SpO2 96%   BMI 48.08 kg/m²   BSA: 2.56 meters squared  No visits with results within 1 Month(s) from this visit.   Latest known visit with results is:   Orders Only on 04/15/2025   Component Date Value Ref Range Status    GLUCOSE 04/15/2025 105 (H)  65 - 99 mg/dL Final    Comment:               Fasting reference interval     For someone without known diabetes, a glucose value  between 100 and 125 mg/dL is consistent with  prediabetes and should be confirmed with a  follow-up test.         UREA NITROGEN (BUN) 04/15/2025 22  7 - 25 mg/dL Final    CREATININE 04/15/2025 0.97  0.70 - 1.35 mg/dL Final    EGFR 04/15/2025 89  > OR = 60 mL/min/1.73m2 Final    BUN/CREATININE RATIO 04/15/2025 SEE NOTE:  6 - 22 (calc) Final    Comment:    Not Reported: BUN and Creatinine are within     reference range.            SODIUM 04/15/2025 139  " 135 - 146 mmol/L Final    POTASSIUM 04/15/2025 4.5  3.5 - 5.3 mmol/L Final    CHLORIDE 04/15/2025 101  98 - 110 mmol/L Final    CARBON DIOXIDE 04/15/2025 29  20 - 32 mmol/L Final    ELECTROLYTE BALANCE 04/15/2025 9  7 - 17 mmol/L (calc) Final    CALCIUM 04/15/2025 9.6  8.6 - 10.3 mg/dL Final       Physical Exam  CONSTITUTIONAL - obese, well developed, looks like stated age, in no acute distress, not ill-appearing, and not tired appearing  SKIN -darkening of skin in the lower extremities, normal skin turgor without rash, lesions, or nodules visualized  HEAD - no trauma, normocephalic  EYES - normal external exam  ENT - TM's intact, no injection, no signs of infection, uvula midline, normal tongue movement and throat normal, no exudate  NECK - supple without rigidity, no neck mass was observed, no thyromegaly or thyroid nodules  CHEST - clear to auscultation, no wheezing, no crackles and no rales, good effort  CARDIAC - regular rate and regular rhythm, no skipped beats, no murmur  EXTREMITIES -1+ pitting edema, slight discoloration of the lower skin which appears to be chronic venous stasis  NEUROLOGICAL - normal gait, normal balance, normal motor, no ataxia  PSYCHIATRIC - alert, pleasant and cordial, age-appropriate  IMMUNOLOGIC - no cervical lymphadenopathy     Assessment/Plan   1. Health Maintenance  Complete history and physical examination was performed  EKG was not performed at today's visit  We will notify of test results once available and make treatment recommendations accordingly    2. HTN/Tachycardia  I would recommend restarting olmesartan but at a lower dose at 10 mg daily or even increasing to 20 mg if you are not fully stable  I would like to have you monitor and record blood pressures at home  Blood pressure goal should be below 130/80, ideally 120/80    3. HLD  Lipid panel was ordered today.  We will notify you of the results and make recommendations accordingly.  In the meantime continue atorvastatin  40 mg daily  Refill sent to your pharmacy    4. Chest discomfort  Continue to follow-up with cardiology per their protocol  Negative stress test noted in the past  If this continues, we may need to further evaluate for worsening acid reflux    5. Ascending Aortic Aneurysm   Please follow-up per their protocol with your cardiothoracic surgeon in 2 years      Follow-up in 6 months for medication check.

## 2025-07-27 LAB
ALBUMIN SERPL-MCNC: 4.4 G/DL (ref 3.6–5.1)
ALP SERPL-CCNC: 80 U/L (ref 35–144)
ALT SERPL-CCNC: 28 U/L (ref 9–46)
ANION GAP SERPL CALCULATED.4IONS-SCNC: 10 MMOL/L (CALC) (ref 7–17)
AST SERPL-CCNC: 28 U/L (ref 10–35)
BASOPHILS # BLD AUTO: 41 CELLS/UL (ref 0–200)
BASOPHILS NFR BLD AUTO: 0.7 %
BILIRUB SERPL-MCNC: 0.7 MG/DL (ref 0.2–1.2)
BUN SERPL-MCNC: 20 MG/DL (ref 7–25)
CALCIUM SERPL-MCNC: 9.2 MG/DL (ref 8.6–10.3)
CHLORIDE SERPL-SCNC: 103 MMOL/L (ref 98–110)
CHOLEST SERPL-MCNC: 168 MG/DL
CHOLEST/HDLC SERPL: 3.1 (CALC)
CO2 SERPL-SCNC: 26 MMOL/L (ref 20–32)
CREAT SERPL-MCNC: 0.71 MG/DL (ref 0.7–1.35)
EGFRCR SERPLBLD CKD-EPI 2021: 104 ML/MIN/1.73M2
EOSINOPHIL # BLD AUTO: 159 CELLS/UL (ref 15–500)
EOSINOPHIL NFR BLD AUTO: 2.7 %
ERYTHROCYTE [DISTWIDTH] IN BLOOD BY AUTOMATED COUNT: 15.6 % (ref 11–15)
EST. AVERAGE GLUCOSE BLD GHB EST-MCNC: 137 MG/DL
EST. AVERAGE GLUCOSE BLD GHB EST-SCNC: 7.6 MMOL/L
GLUCOSE SERPL-MCNC: 120 MG/DL (ref 65–99)
HBA1C MFR BLD: 6.4 %
HCT VFR BLD AUTO: 44 % (ref 38.5–50)
HDLC SERPL-MCNC: 54 MG/DL
HGB BLD-MCNC: 13.7 G/DL (ref 13.2–17.1)
LDLC SERPL CALC-MCNC: 91 MG/DL (CALC)
LYMPHOCYTES # BLD AUTO: 1794 CELLS/UL (ref 850–3900)
LYMPHOCYTES NFR BLD AUTO: 30.4 %
MCH RBC QN AUTO: 30.2 PG (ref 27–33)
MCHC RBC AUTO-ENTMCNC: 31.1 G/DL (ref 32–36)
MCV RBC AUTO: 97.1 FL (ref 80–100)
MONOCYTES # BLD AUTO: 484 CELLS/UL (ref 200–950)
MONOCYTES NFR BLD AUTO: 8.2 %
NEUTROPHILS # BLD AUTO: 3422 CELLS/UL (ref 1500–7800)
NEUTROPHILS NFR BLD AUTO: 58 %
NONHDLC SERPL-MCNC: 114 MG/DL (CALC)
PLATELET # BLD AUTO: 206 THOUSAND/UL (ref 140–400)
PMV BLD REES-ECKER: 9.1 FL (ref 7.5–12.5)
POTASSIUM SERPL-SCNC: 4.4 MMOL/L (ref 3.5–5.3)
PROT SERPL-MCNC: 7 G/DL (ref 6.1–8.1)
PSA SERPL-MCNC: 0.54 NG/ML
RBC # BLD AUTO: 4.53 MILLION/UL (ref 4.2–5.8)
SODIUM SERPL-SCNC: 139 MMOL/L (ref 135–146)
TRIGL SERPL-MCNC: 131 MG/DL
TSH SERPL-ACNC: 2.18 MIU/L (ref 0.4–4.5)
WBC # BLD AUTO: 5.9 THOUSAND/UL (ref 3.8–10.8)

## 2025-09-30 ENCOUNTER — APPOINTMENT (OUTPATIENT)
Dept: PRIMARY CARE | Facility: CLINIC | Age: 60
End: 2025-09-30
Payer: COMMERCIAL

## 2025-10-31 ENCOUNTER — APPOINTMENT (OUTPATIENT)
Dept: CARDIOLOGY | Facility: CLINIC | Age: 60
End: 2025-10-31
Payer: COMMERCIAL